# Patient Record
Sex: FEMALE | Race: WHITE | NOT HISPANIC OR LATINO | URBAN - METROPOLITAN AREA
[De-identification: names, ages, dates, MRNs, and addresses within clinical notes are randomized per-mention and may not be internally consistent; named-entity substitution may affect disease eponyms.]

---

## 2017-12-12 ENCOUNTER — EMERGENCY (EMERGENCY)
Facility: HOSPITAL | Age: 77
LOS: 1 days | Discharge: HOME | End: 2017-12-12
Attending: EMERGENCY MEDICINE | Admitting: EMERGENCY MEDICINE
Payer: MEDICARE

## 2017-12-12 VITALS
HEART RATE: 94 BPM | HEIGHT: 64 IN | DIASTOLIC BLOOD PRESSURE: 100 MMHG | SYSTOLIC BLOOD PRESSURE: 171 MMHG | RESPIRATION RATE: 16 BRPM | WEIGHT: 199.96 LBS | OXYGEN SATURATION: 98 % | TEMPERATURE: 98 F

## 2017-12-12 DIAGNOSIS — Z98.49 CATARACT EXTRACTION STATUS, UNSPECIFIED EYE: Chronic | ICD-10-CM

## 2017-12-12 PROCEDURE — 99282 EMERGENCY DEPT VISIT SF MDM: CPT

## 2017-12-12 PROCEDURE — 99284 EMERGENCY DEPT VISIT MOD MDM: CPT

## 2017-12-12 RX ORDER — ACETAMINOPHEN 500 MG
650 TABLET ORAL ONCE
Qty: 0 | Refills: 0 | Status: DISCONTINUED | OUTPATIENT
Start: 2017-12-12 | End: 2017-12-16

## 2017-12-12 NOTE — ED ADULT NURSE NOTE - CHPI ED SYMPTOMS NEG
no foreign body/no double vision/no purulent drainage/no eye lid swelling/no discharge/no drainage/no blurred vision/no itching/no photophobia/no pain

## 2017-12-12 NOTE — ED PROVIDER NOTE - OBJECTIVE STATEMENT
76 y/o F pt presents to ED c/o left eye redness associated with pain and  burning sensation. Pt notes every morning she rubs her eyes with cotton balls. Pt has scheduled eye appointment tomorrow morning. No blood thinners. No trauma, no falls. No further complaints at this time.

## 2022-09-12 ENCOUNTER — EMERGENCY (EMERGENCY)
Facility: HOSPITAL | Age: 82
LOS: 1 days | Discharge: HOME | End: 2022-09-12
Attending: EMERGENCY MEDICINE | Admitting: EMERGENCY MEDICINE

## 2022-09-12 VITALS
OXYGEN SATURATION: 98 % | WEIGHT: 190.04 LBS | DIASTOLIC BLOOD PRESSURE: 90 MMHG | HEART RATE: 90 BPM | TEMPERATURE: 98 F | RESPIRATION RATE: 14 BRPM | SYSTOLIC BLOOD PRESSURE: 166 MMHG | HEIGHT: 64 IN

## 2022-09-12 VITALS
SYSTOLIC BLOOD PRESSURE: 148 MMHG | TEMPERATURE: 98 F | RESPIRATION RATE: 16 BRPM | DIASTOLIC BLOOD PRESSURE: 78 MMHG | OXYGEN SATURATION: 99 % | HEART RATE: 87 BPM

## 2022-09-12 DIAGNOSIS — Z98.49 CATARACT EXTRACTION STATUS, UNSPECIFIED EYE: Chronic | ICD-10-CM

## 2022-09-12 PROCEDURE — 99283 EMERGENCY DEPT VISIT LOW MDM: CPT

## 2022-09-12 RX ORDER — LOPERAMIDE HCL 2 MG
4 TABLET ORAL ONCE
Refills: 0 | Status: COMPLETED | OUTPATIENT
Start: 2022-09-12 | End: 2022-09-12

## 2022-09-12 RX ADMIN — Medication 4 MILLIGRAM(S): at 19:57

## 2022-09-12 NOTE — ED PROVIDER NOTE - CARE PROVIDER_API CALL
Guillermo Belcher)  Surgery  [address] [address]  Phone: [phone]  Fax: [phone]  Follow Up Time: 1-3 Days    Bull Null)  ColonRectal Surgery; Surgery  [address], [address]  [place] [address]  Phone: [phone]  Fax: [phone]  Follow Up Time: 1-3 Days

## 2022-09-12 NOTE — ED PROVIDER NOTE - NSFOLLOWUPINSTRUCTIONS_ED_ALL_ED_FT
Rectal Pain    WHAT YOU NEED TO KNOW:    Rectal pain can be caused by a number of conditions, such as hemorrhoids, an abscess, trauma, or anal tear. Infection, muscle spasms, or anal intercourse can also cause rectal pain.     DISCHARGE INSTRUCTIONS:    Medicines: You may need any of the following:   •NSAIDs, such as ibuprofen, help decrease swelling, pain, and fever. This medicine is available with or without a doctor's order. NSAIDs can cause stomach bleeding or kidney problems in certain people. If you take blood thinner medicine, always ask your healthcare provider if NSAIDs are safe for you. Always read the medicine label and follow directions.      •Prescription pain medicine may be given. Ask how to take this medicine safely.      •Antibiotics help treat or prevent a bacterial infection.      •Bowel movement softeners help soften your bowel movement. They help prevent straining and more damage to the area.      •Take your medicine as directed. Contact your healthcare provider if you think your medicine is not helping or if you have side effects. Tell your provider if you are allergic to any medicine. Keep a list of the medicines, vitamins, and herbs you take. Include the amounts, and when and why you take them. Bring the list or the pill bottles to follow-up visits. Carry your medicine list with you in case of an emergency.      Return to the emergency department if:   •You have severe pain.          Contact your healthcare provider if:   •Your pain does not decrease after 1 to 2 days of treatment.      •You cannot take the medicine prescribed for your condition.      •You have questions or concerns about your condition or care.      Take a sitz bath: Fill a bathtub with 4 to 6 inches of warm water. You may also use a sitz bath pan that fits over a toilet. Sit in the sitz bath for 20 minutes. Do this 2 to 3 times a day, or as directed. The warm water can help decrease pain, muscle spasms, or swelling.     Apply heat: Apply a warm, moist compress on your anus for 20 to 30 minutes every 2 hours for as many days as directed. Heat helps decrease pain and muscle spasms.    Eat high-fiber foods: This will help prevent constipation and soften your bowel movements. High-fiber foods include fruit, vegetables, whole-grain breads and cereals, and beans. A dietitian or healthcare provider can help you create a high-fiber meal plan.     Drink liquids as directed: You may need to drink more liquid than usual to help soften your bowel movements. Ask how much liquid to drink each day and which liquids are best for you.     Follow up with your healthcare provider as directed: Write down your questions so you remember to ask them during your visits.       © Copyright [employer] 2022           Food Choices to Help Relieve Diarrhea, Adult      Diarrhea can make you feel weak and cause you to become dehydrated. It is important to choose the right foods and drinks to:  •Relieve diarrhea.      •Replace lost fluids and nutrients.      •Prevent dehydration.        What are tips for following this plan?    Relieving diarrhea   •Avoid foods that make your diarrhea worse. These may include:  •Foods and beverages sweetened with high-fructose corn syrup, honey, or sweeteners such as xylitol, sorbitol, and mannitol.      •Fried, greasy, or spicy foods.      •Raw fruits and vegetables.        •Eat foods that are rich in probiotics. These include foods such as yogurt and fermented milk products. Probiotics can help increase healthy bacteria in your stomach and intestines (gastrointestinal tract or GI tract). This may help digestion and stop diarrhea.      •If you have lactose intolerance, avoid dairy products. These may make your diarrhea worse.      •Take medicine to help stop diarrhea only as told by your health care provider.        Replacing nutrients      •Eat bland, easy-to-digest foods in small amounts as you are able, until your diarrhea starts to get better. These foods include bananas, applesauce, rice, toast, and crackers.    •Gradually reintroduce nutrient-rich foods as tolerated or as told by your health care provider. This includes:  •Well-cooked protein foods, such as eggs, lean meats like fish or chicken without skin, and tofu.      •Peeled, seeded, and soft-cooked fruits and vegetables.      •Low-fat dairy products.      •Whole grains.        •Take vitamin and mineral supplements as told by your health care provider.        Preventing dehydration      •Start by sipping water or a solution to prevent dehydration (oral rehydration solution, ORS). This is a drink that helps replace fluids and minerals your body has lost. You can buy an ORS at pharmacies and retail stores.      •Try to drink at least 8–10 cups (2,000–2,500 mL) of fluid each day to help replace lost fluids. If you have urine that is pale yellow, you are getting enough fluids.      •You may drink other liquids in addition to water, such as fruit juice that you have added water to (diluted fruit juice) or low-calorie sports drinks, as tolerated or as told by your health care provider.      •Avoid drinks with caffeine, such as coffee, tea, or soft drinks.      •Avoid alcohol.        Summary    •When you have diarrhea, it is important to choose the right foods and drinks to relieve diarrhea, to replace lost fluids and nutrients, and to prevent dehydration.      •Make sure you drink enough fluid to keep your urine pale yellow.      •You may benefit from eating bland foods at first. Gradually reintroduce healthy, nutrient-rich foods as tolerated or as told by your health care provider.      •Avoid foods that make your diarrhea worse, such as fried, greasy, or spicy foods.      This information is not intended to replace advice given to you by your health care provider. Make sure you discuss any questions you have with your health care provider.

## 2022-09-12 NOTE — ED PROVIDER NOTE - PROVIDER TOKENS
PROVIDER:[TOKEN:[[id number]],FOLLOWUP:[1-3 Days]],PROVIDER:[TOKEN:[[id number]],FOLLOWUP:[1-3 Days]]

## 2022-09-12 NOTE — ED PROVIDER NOTE - GASTROINTESTINAL, MLM
Rectal no external hemorrhoids noted at this time. Positive inflamed skin around anal area. No fluctuance. No apparent cellulitis. No unable to tolerate digital exam.

## 2022-09-12 NOTE — ED PROVIDER NOTE - OBJECTIVE STATEMENT
81 y/o female c/o rectal pain. States 6 days ago had abdominal pain and diarrhea and thought it was food related. Abdominal pain has subsided but loose stools continue. Stool is sometimes dark, sometimes yellow. No note of blood. No fever, nausea, vomiting. Remains without abdominal pain. Two days ago pt went to UC for rectal pain and was advised that she has hemorrhoids and was prescribed rectal steroid cream. Pt then saw GI doctor today and was prescribed an anesthetic cream and another steroid cream. Was advised to f/u with colorectal however pt states first appointment is not until almost 2 months. Pt still with pain and discomfort in rectum and burning especially with diarrhea. Pt has not taken anything for diarrhea.

## 2022-09-12 NOTE — ED PROVIDER NOTE - CLINICAL SUMMARY MEDICAL DECISION MAKING FREE TEXT BOX
83 y/o female c/o rectal irritation likely caused by 1 week of loose stools. Is already on rectal steroid and anesthetic. Has already seen OP GI. Will give antidiarrheal, discussed dietary changes to help with diarrhea. Will provide further referral for OP colorectal. Discussed pt should return for vomiting, abdominal pain, fever, or worsening symptoms.

## 2022-09-12 NOTE — ED PROVIDER NOTE - PATIENT PORTAL LINK FT
You can access the FollowMyHealth Patient Portal offered by [facility] by registering at the following website: [email or web address]. By joining [employer]’s FollowMyHealth portal, you will also be able to view your health information using other applications (apps) compatible with our system.

## 2022-09-12 NOTE — ED ADULT NURSE NOTE - ED COMFORT CARE
Lactation Rounds:    Mother called for lactation advise. Upon arrival mother reports severe primary engorgement. Large lumps underneath armpits visualized. Mother reports supernumerary nipple underneath right armpit leaking when pumping. Encouraged mother to pump 8-12 times per day. Limit pumping to 15-30minutes each session. Use hands on pumping technique. Mother reports using 24-27 mm flanges. Encouraged to trail 30mm flanges, referred to lawrence flange fit guide. Mother to bring pump parts and flanges tomorrow for verification of flange fit. Mother denies signs/symptoms of mastitis. Reviewed when to call OBGYN. Mother verbalized understanding. Will follow up tomorrow.     Primary Engorgement    If the milk is flowing, use wet or dry heat applied to the breasts for approximately 10min prior to each feeding as a comfort measure to facilitate the milk ejection reflex    Follow heat treatment with breast massage to soften hard/lumpy areas of the breast    Hand express or pump breasts to the point of comfort prn    Use cold treatments in the form of ice packs/gel packs/ frozen vegetables wrapped in a soft thin cloth and applied to the breasts for approximately 20min after each feeding until engorgement is resolved    Wear comfortable, supportive bra    Take pain medicine prn    Use anti-inflammatory medications if prescribed by physician                    Consent (Temporal Branch)/Introductory Paragraph: The rationale for Mohs was explained to the patient and consent was obtained. The risks, benefits and alternatives to therapy were discussed in detail. Specifically, the risks of damage to the temporal branch of the facial nerve, infection, scarring, bleeding, prolonged wound healing, incomplete removal, allergy to anesthesia, and recurrence were addressed. Prior to the procedure, the treatment site was clearly identified and confirmed by the patient. All components of Universal Protocol/PAUSE Rule completed. Patient informed/Explanation of wait

## 2022-09-12 NOTE — ED PROVIDER NOTE - PROGRESS NOTE DETAILS
Jacob Rodriguez :   Case discussed with Dr. Hobbs. No further treatment available from ER. Does advise pt f/u with GI or colorectal for scope. Provides name for referral.

## 2022-09-13 ENCOUNTER — INPATIENT (INPATIENT)
Facility: HOSPITAL | Age: 82
LOS: 3 days | Discharge: HOME | DRG: 386 | End: 2022-09-17
Attending: INTERNAL MEDICINE | Admitting: INTERNAL MEDICINE
Payer: MEDICARE

## 2022-09-13 VITALS
RESPIRATION RATE: 20 BRPM | TEMPERATURE: 99 F | DIASTOLIC BLOOD PRESSURE: 98 MMHG | WEIGHT: 190.04 LBS | OXYGEN SATURATION: 95 % | SYSTOLIC BLOOD PRESSURE: 168 MMHG | HEART RATE: 100 BPM | HEIGHT: 64 IN

## 2022-09-13 DIAGNOSIS — R19.7 DIARRHEA, UNSPECIFIED: ICD-10-CM

## 2022-09-13 DIAGNOSIS — K56.41 FECAL IMPACTION: ICD-10-CM

## 2022-09-13 DIAGNOSIS — K59.00 CONSTIPATION, UNSPECIFIED: ICD-10-CM

## 2022-09-13 DIAGNOSIS — Z98.49 CATARACT EXTRACTION STATUS, UNSPECIFIED EYE: Chronic | ICD-10-CM

## 2022-09-13 PROBLEM — I10 ESSENTIAL (PRIMARY) HYPERTENSION: Chronic | Status: ACTIVE | Noted: 2017-12-12

## 2022-09-13 LAB
ALBUMIN SERPL ELPH-MCNC: 3.7 G/DL — SIGNIFICANT CHANGE UP (ref 3.3–5)
ALP SERPL-CCNC: 96 U/L — SIGNIFICANT CHANGE UP (ref 30–120)
ALT FLD-CCNC: 23 U/L DA — SIGNIFICANT CHANGE UP (ref 10–60)
ANION GAP SERPL CALC-SCNC: 7 MMOL/L — SIGNIFICANT CHANGE UP (ref 5–17)
AST SERPL-CCNC: 18 U/L — SIGNIFICANT CHANGE UP (ref 10–40)
BASOPHILS # BLD AUTO: 0.02 K/UL — SIGNIFICANT CHANGE UP (ref 0–0.2)
BASOPHILS NFR BLD AUTO: 0.2 % — SIGNIFICANT CHANGE UP (ref 0–2)
BILIRUB SERPL-MCNC: 0.4 MG/DL — SIGNIFICANT CHANGE UP (ref 0.2–1.2)
BUN SERPL-MCNC: 12 MG/DL — SIGNIFICANT CHANGE UP (ref 7–23)
CALCIUM SERPL-MCNC: 8.8 MG/DL — SIGNIFICANT CHANGE UP (ref 8.4–10.5)
CHLORIDE SERPL-SCNC: 94 MMOL/L — LOW (ref 96–108)
CO2 SERPL-SCNC: 28 MMOL/L — SIGNIFICANT CHANGE UP (ref 22–31)
CREAT SERPL-MCNC: 0.66 MG/DL — SIGNIFICANT CHANGE UP (ref 0.5–1.3)
EGFR: 88 ML/MIN/1.73M2 — SIGNIFICANT CHANGE UP
EOSINOPHIL # BLD AUTO: 0.01 K/UL — SIGNIFICANT CHANGE UP (ref 0–0.5)
EOSINOPHIL NFR BLD AUTO: 0.1 % — SIGNIFICANT CHANGE UP (ref 0–6)
GLUCOSE SERPL-MCNC: 139 MG/DL — HIGH (ref 70–99)
HCT VFR BLD CALC: 40.4 % — SIGNIFICANT CHANGE UP (ref 34.5–45)
HGB BLD-MCNC: 13.6 G/DL — SIGNIFICANT CHANGE UP (ref 11.5–15.5)
IMM GRANULOCYTES NFR BLD AUTO: 0.3 % — SIGNIFICANT CHANGE UP (ref 0–1.5)
LIDOCAIN IGE QN: 37 U/L — LOW (ref 73–393)
LYMPHOCYTES # BLD AUTO: 1.41 K/UL — SIGNIFICANT CHANGE UP (ref 1–3.3)
LYMPHOCYTES # BLD AUTO: 15.4 % — SIGNIFICANT CHANGE UP (ref 13–44)
MCHC RBC-ENTMCNC: 27.8 PG — SIGNIFICANT CHANGE UP (ref 27–34)
MCHC RBC-ENTMCNC: 33.7 GM/DL — SIGNIFICANT CHANGE UP (ref 32–36)
MCV RBC AUTO: 82.6 FL — SIGNIFICANT CHANGE UP (ref 80–100)
MONOCYTES # BLD AUTO: 0.68 K/UL — SIGNIFICANT CHANGE UP (ref 0–0.9)
MONOCYTES NFR BLD AUTO: 7.4 % — SIGNIFICANT CHANGE UP (ref 2–14)
NEUTROPHILS # BLD AUTO: 6.99 K/UL — SIGNIFICANT CHANGE UP (ref 1.8–7.4)
NEUTROPHILS NFR BLD AUTO: 76.6 % — SIGNIFICANT CHANGE UP (ref 43–77)
NRBC # BLD: 0 /100 WBCS — SIGNIFICANT CHANGE UP (ref 0–0)
PLATELET # BLD AUTO: 289 K/UL — SIGNIFICANT CHANGE UP (ref 150–400)
POTASSIUM SERPL-MCNC: 4 MMOL/L — SIGNIFICANT CHANGE UP (ref 3.5–5.3)
POTASSIUM SERPL-SCNC: 4 MMOL/L — SIGNIFICANT CHANGE UP (ref 3.5–5.3)
PROT SERPL-MCNC: 7.4 G/DL — SIGNIFICANT CHANGE UP (ref 6–8.3)
RBC # BLD: 4.89 M/UL — SIGNIFICANT CHANGE UP (ref 3.8–5.2)
RBC # FLD: 14.2 % — SIGNIFICANT CHANGE UP (ref 10.3–14.5)
SARS-COV-2 RNA SPEC QL NAA+PROBE: SIGNIFICANT CHANGE UP
SODIUM SERPL-SCNC: 129 MMOL/L — LOW (ref 135–145)
WBC # BLD: 9.14 K/UL — SIGNIFICANT CHANGE UP (ref 3.8–10.5)
WBC # FLD AUTO: 9.14 K/UL — SIGNIFICANT CHANGE UP (ref 3.8–10.5)

## 2022-09-13 PROCEDURE — 99223 1ST HOSP IP/OBS HIGH 75: CPT

## 2022-09-13 PROCEDURE — 74177 CT ABD & PELVIS W/CONTRAST: CPT | Mod: 26,MA

## 2022-09-13 PROCEDURE — 93010 ELECTROCARDIOGRAM REPORT: CPT

## 2022-09-13 PROCEDURE — 99285 EMERGENCY DEPT VISIT HI MDM: CPT | Mod: FS

## 2022-09-13 PROCEDURE — 71045 X-RAY EXAM CHEST 1 VIEW: CPT | Mod: 26

## 2022-09-13 RX ORDER — SOD SULF/SODIUM/NAHCO3/KCL/PEG
1000 SOLUTION, RECONSTITUTED, ORAL ORAL ONCE
Refills: 0 | Status: COMPLETED | OUTPATIENT
Start: 2022-09-13 | End: 2022-09-13

## 2022-09-13 RX ORDER — ENOXAPARIN SODIUM 100 MG/ML
40 INJECTION SUBCUTANEOUS EVERY 24 HOURS
Refills: 0 | Status: DISCONTINUED | OUTPATIENT
Start: 2022-09-14 | End: 2022-09-17

## 2022-09-13 RX ORDER — KETOROLAC TROMETHAMINE 30 MG/ML
15 SYRINGE (ML) INJECTION ONCE
Refills: 0 | Status: DISCONTINUED | OUTPATIENT
Start: 2022-09-13 | End: 2022-09-13

## 2022-09-13 RX ORDER — SENNA PLUS 8.6 MG/1
2 TABLET ORAL AT BEDTIME
Refills: 0 | Status: DISCONTINUED | OUTPATIENT
Start: 2022-09-13 | End: 2022-09-17

## 2022-09-13 RX ORDER — ONDANSETRON 8 MG/1
4 TABLET, FILM COATED ORAL ONCE
Refills: 0 | Status: COMPLETED | OUTPATIENT
Start: 2022-09-13 | End: 2022-09-13

## 2022-09-13 RX ORDER — POLYETHYLENE GLYCOL 3350 17 G/17G
17 POWDER, FOR SOLUTION ORAL DAILY
Refills: 0 | Status: DISCONTINUED | OUTPATIENT
Start: 2022-09-13 | End: 2022-09-17

## 2022-09-13 RX ORDER — DIAZEPAM 5 MG
2 TABLET ORAL ONCE
Refills: 0 | Status: DISCONTINUED | OUTPATIENT
Start: 2022-09-13 | End: 2022-09-13

## 2022-09-13 RX ORDER — LOSARTAN POTASSIUM 100 MG/1
50 TABLET, FILM COATED ORAL DAILY
Refills: 0 | Status: DISCONTINUED | OUTPATIENT
Start: 2022-09-13 | End: 2022-09-16

## 2022-09-13 RX ORDER — SODIUM CHLORIDE 9 MG/ML
1000 INJECTION INTRAMUSCULAR; INTRAVENOUS; SUBCUTANEOUS ONCE
Refills: 0 | Status: COMPLETED | OUTPATIENT
Start: 2022-09-13 | End: 2022-09-13

## 2022-09-13 RX ORDER — ACETAMINOPHEN 500 MG
650 TABLET ORAL EVERY 6 HOURS
Refills: 0 | Status: DISCONTINUED | OUTPATIENT
Start: 2022-09-13 | End: 2022-09-17

## 2022-09-13 RX ADMIN — SODIUM CHLORIDE 1000 MILLILITER(S): 9 INJECTION INTRAMUSCULAR; INTRAVENOUS; SUBCUTANEOUS at 12:14

## 2022-09-13 RX ADMIN — SODIUM CHLORIDE 1000 MILLILITER(S): 9 INJECTION INTRAMUSCULAR; INTRAVENOUS; SUBCUTANEOUS at 10:58

## 2022-09-13 RX ADMIN — Medication 15 MILLIGRAM(S): at 16:00

## 2022-09-13 RX ADMIN — Medication 15 MILLIGRAM(S): at 10:58

## 2022-09-13 RX ADMIN — Medication 650 MILLIGRAM(S): at 23:45

## 2022-09-13 RX ADMIN — Medication 15 MILLIGRAM(S): at 15:40

## 2022-09-13 RX ADMIN — ONDANSETRON 4 MILLIGRAM(S): 8 TABLET, FILM COATED ORAL at 10:58

## 2022-09-13 RX ADMIN — Medication 15 MILLIGRAM(S): at 11:15

## 2022-09-13 RX ADMIN — Medication 1000 MILLILITER(S): at 21:57

## 2022-09-13 RX ADMIN — SENNA PLUS 2 TABLET(S): 8.6 TABLET ORAL at 21:57

## 2022-09-13 RX ADMIN — Medication 2 MILLIGRAM(S): at 12:48

## 2022-09-13 RX ADMIN — Medication 650 MILLIGRAM(S): at 23:10

## 2022-09-13 NOTE — ED PROVIDER NOTE - NS ED ATTENDING STATEMENT MOD
This was a shared visit with the BONILLA. I reviewed and verified the documentation and independently performed the documented:

## 2022-09-13 NOTE — ED PROVIDER NOTE - OBJECTIVE STATEMENT
82-year-old female with history of hypertension presents with diarrhea and rectal pain.  Patient reports started having diarrhea 6 days ago.  Ate Burger Moody the day before and unsure if related.  Started to have mild rectal pain that day, rectal pain has progressively getting worse.  States diarrhea very loose in nature.  No noticed blood in her diarrhea.  No fevers.  Was seen at urgent care 2 days ago and told rectal pain likely due to hemorrhoid.  Was given rectal steroid cream.  Was seen by GI yesterday and was given an anesthetic cream and another steroid cream.  Was recommended to follow-up with colorectal surgery.  Unable to make appointment for 2 months.  Was seen at this emergency room last night for her rectal pain.  Patient was discussed with Dr. Hobbs by ED attending. told at that time likely nothing else to do.  Recommended to follow-up with colorectal surgery.  Patient reports having a hard time sleeping last night due to the rectal pain and discomfort.  Slight abdominal discomfort the past 2 days.  States mild crampy in nature.  Slight nausea since yesterday, no vomiting.  No recent antibiotic use.  No foreign travels.  No known sick exposures.  Previous abdominal surgery include cholecystectomy.  PCP Carmen Mckeon

## 2022-09-13 NOTE — ED ADULT NURSE NOTE - OBJECTIVE STATEMENT
82 YOF A&OX3 presents to ED for rectal pain. pt states was in ED last night and has rectal pain that did not go away. pt rates pain 10/10. pt denies bloody stools, n/v/d, headaches, sob, chest pain. safety maintained. 82 YOF A&OX3 presents to ED for rectal pain and facial neuralgia. pt states was in ED last night and has rectal pain that did not go away. pt rates pain 10/10. pt denies bloody stools, n/v/d, headaches, sob, chest pain. safety maintained.

## 2022-09-13 NOTE — ED PROVIDER NOTE - GASTROINTESTINAL NEGATIVE STATEMENT, MLM
mild abdominal pain, no bloating, no constipation, + diarrhea, slight nausea and no vomiting. +rectal pain

## 2022-09-13 NOTE — ED PROVIDER NOTE - GASTROINTESTINAL, MLM
Abdomen soft, non-tender, no guarding. no rebound or distention. no obvious ext hemorrhoids or jerrod-rectal abscess. refusing rectal exam

## 2022-09-13 NOTE — H&P ADULT - ASSESSMENT
82-year-old female with history of hypertension presents with diarrhea and rectal pain.  Patient reports started having diarrhea 6 days ago.  Ate Burger Moody the day before and unsure if related.  Started to have mild rectal pain that day, rectal pain has progressively getting worse.  States diarrhea very loose in nature.  No noticed blood in her diarrhea.  No fevers.  Was seen at urgent care 2 days ago and told rectal pain likely due to hemorrhoid.  Was given rectal steroid cream.  Was seen by GI yesterday and was given an anesthetic cream and another steroid cream.  Was recommended to follow-up with colorectal surgery.  Unable to make appointment for 2 months.  Was seen at this emergency room last night for her rectal pain.  Patient was discussed with Dr. Hobbs by ED attending. told at that time likely nothing else to do.  Recommended to follow-up with colorectal surgery.  Patient reports having a hard time sleeping last night due to the rectal pain and discomfort.  Slight abdominal discomfort the past 2 days.  States mild crampy in nature.  Slight nausea since yesterday, no vomiting.  No recent antibiotic use.  No foreign travels.  No known sick exposures.  Previous abdominal surgery include cholecystectomy.PCP Carmen Mckeon  had CT abd in ED < from: CT Abdomen and Pelvis w/ IV Cont (09.13.22 @ 12:08) >  Constipated colon with stercoral proctocolitis  1.5 x 1.2 cm pancreatic head cystic lesion. Recommend MRI   Small sliding hiatus hernia with thickened distal esophagus. Recommend   endoscopy.  received enema and disimpaction tried by ED physician and GI consult called   82-year-old female with history of hypertension presents with diarrhea and rectal pain.  Patient reports started having diarrhea 6 days ago.  Ate Burger Moody the day before and unsure if related.  Started to have mild rectal pain that day, rectal pain has progressively getting worse.  States diarrhea very loose in nature.  No noticed blood in her diarrhea.  No fevers.  Was seen at urgent care 2 days ago and told rectal pain likely due to hemorrhoid.  Was given rectal steroid cream.  Was seen by GI yesterday and was given an anesthetic cream and another steroid cream.  Was recommended to follow-up with colorectal surgery.  Unable to make appointment for 2 months.  Was seen at this emergency room last night for her rectal pain.  Patient was discussed with Dr. Hobbs by ED attending. told at that time likely nothing else to do.  Recommended to follow-up with colorectal surgery.  Patient reports having a hard time sleeping last night due to the rectal pain and discomfort.  Slight abdominal discomfort the past 2 days.  States mild crampy in nature.  Slight nausea since yesterday, no vomiting.  No recent antibiotic use.  No foreign travels.  No known sick exposures.  Previous abdominal surgery include cholecystectomy.PCP Carmen Mckeon  had CT abd in ED < from: CT Abdomen and Pelvis w/ IV Cont (09.13.22 @ 12:08) >  Constipated colon with stercoral proctocolitis  1.5 x 1.2 cm pancreatic head cystic lesion. Recommend MRI   Small sliding hiatus hernia with thickened distal esophagus. Recommend   endoscopy.  received enema and disimpaction tried by ED physician and GI consult called   fecal impaction with proctocolitis with overflow incontinence  HTN

## 2022-09-13 NOTE — ED ADULT NURSE REASSESSMENT NOTE - NS ED NURSE REASSESS COMMENT FT1
pt states took PM meds self administered at 6pm. MD Pettit aware and at bedside. safety maintained.
pt unable to provide stool sample due to constipation. pt admitted, awaiting bed.
pt received at change of shift, pt resting on stretcher, bed in low position, call bell within reach, plan of care discussed, will monitor.  pt admitted, awaiting bed assignment.

## 2022-09-13 NOTE — PATIENT PROFILE ADULT - FALL HARM RISK - UNIVERSAL INTERVENTIONS
Bed in lowest position, wheels locked, appropriate side rails in place/Call bell, personal items and telephone in reach/Instruct patient to call for assistance before getting out of bed or chair/Non-slip footwear when patient is out of bed/[place] to call system/Physically safe environment - no spills, clutter or unnecessary equipment/Purposeful Proactive Rounding/Room/bathroom lighting operational, light cord in reach

## 2022-09-13 NOTE — H&P ADULT - TIME BILLING
I have discussed care plan with patient ,expressed understanding of problems treatment and their effect and side effects, alternatives in detail,I have asked if they have any questions and concerns and appropriately addressed them to best of my ability  Reviewed all diagonostic tests, lab results and drug drug interactions, and medications

## 2022-09-13 NOTE — ED PROVIDER NOTE - CLINICAL SUMMARY MEDICAL DECISION MAKING FREE TEXT BOX
DT: I have personally performed a face to face diagnostic evaluation on this patient.  I have reviewed the PA's note and agree with the history, exam, and plan of care, except as noted.  History and Exam by me shows 82-year-old female with history of hypertension presents with diarrhea and rectal pain.  Patient reports started having diarrhea 6 days ago.  Ate Burger Moody the day before and unsure if related.  Started to have mild rectal pain that day, rectal pain has progressively getting worse.  States diarrhea very loose in nature.  No noticed blood in her diarrhea.  No fevers.  Was seen at urgent care 2 days ago and told rectal pain likely due to hemorrhoid.  Was given rectal steroid cream.  Was seen by GI yesterday and was given an anesthetic cream and another steroid cream.  Was recommended to follow-up with colorectal surgery.  Unable to make appointment for 2 months.  Was seen at this emergency room last night for her rectal pain.  Patient was discussed with Dr. Hobbs by ED attending. told at that time likely nothing else to do.  Recommended to follow-up with colorectal surgery.  Patient reports having a hard time sleeping last night due to the rectal pain and discomfort.  Slight abdominal discomfort the past 2 days.  States mild crampy in nature.  Slight nausea since yesterday, no vomiting.  No recent antibiotic use.  No foreign travels.  No known sick exposures.  Previous abdominal surgery include cholecystectomy.  Patient is NAD.  A n O x 3. Head NC/AT. Lungs cta bl. Heart s1,s2, rrr, no murmurs. Abd-soft, nt, no guarding, no rebound, no distension, no cva tenderness. Ext- FROM actively,  ambulating s any difficulty.  Labs and ct  were sig for fecal impaction.

## 2022-09-13 NOTE — H&P ADULT - NEUROLOGICAL
cranial nerves II-XII intact/sensation intact/responds to pain/responds to verbal commands/deep reflexes intact negative

## 2022-09-13 NOTE — ED PROVIDER NOTE - PROGRESS NOTE DETAILS
CT results were discussed with patient. Moderate stool burden with rectum distended to 7.4 cm. suspect diarrhea due to overflow diarrhea. disimpaction was performed and small-moderate amount of soft stool removed. SMOG enema given and small amount of stool removed. continues to be uncomfortable and reports rectal discomfort. will plan to admit. GI paged spoke with Dr. Baires, kindly accepts patient for admission. GI paged, pending response. spoke with Dr. Lizarraga. will see in hospital, likely tomorrow. recommends 1 liter of moviprep. also recommends surgery consult since rectum is distended spoke with Dr. Pettit, will see patient in hospital for consult as requested by Dr. Lizarraga

## 2022-09-13 NOTE — H&P ADULT - HISTORY OF PRESENT ILLNESS
82-year-old female with history of hypertension presents with diarrhea and rectal pain.  Patient reports started having diarrhea 6 days ago.  Ate Burger Moody the day before and unsure if related.  Started to have mild rectal pain that day, rectal pain has progressively getting worse.  States diarrhea very loose in nature.  No noticed blood in her diarrhea.  No fevers.  Was seen at urgent care 2 days ago and told rectal pain likely due to hemorrhoid.  Was given rectal steroid cream.  Was seen by GI yesterday and was given an anesthetic cream and another steroid cream.  Was recommended to follow-up with colorectal surgery.  Unable to make appointment for 2 months.  Was seen at this emergency room last night for her rectal pain.  Patient was discussed with Dr. Hobbs by ED attending. told at that time likely nothing else to do.  Recommended to follow-up with colorectal surgery.  Patient reports having a hard time sleeping last night due to the rectal pain and discomfort.  Slight abdominal discomfort the past 2 days.  States mild crampy in nature.  Slight nausea since yesterday, no vomiting.  No recent antibiotic use.  No foreign travels.  No known sick exposures.  Previous abdominal surgery include cholecystectomy.PCP Carmen Mckeon  had CT abd in ED < from: CT Abdomen and Pelvis w/ IV Cont (09.13.22 @ 12:08) >  Constipated colon with stercoral proctocolitis  1.5 x 1.2 cm pancreatic head cystic lesion. Recommend MRI   Small sliding hiatus hernia with thickened distal esophagus. Recommend   endoscopy.  received enema and disimpaction tried by ED physician and GI consult called

## 2022-09-13 NOTE — H&P ADULT - SKIN/BREAST
[facility] [place] 
THE [facility] EMERGENCY DEPT 
[facility] [place] [id number] [phone] Work/School Note Date: 9/13/2017 To Whom It May concern: 
 
Facundo Gage was seen and treated today in the emergency room by the following provider(s): 
Attending Provider: Evelina Bailey MD. Facundo Gage may return to work on 09/18/2017. Sincerely, 
 
 
 
 
 
[employer].  Karma Bermudez MD 
 
 

negative

## 2022-09-14 DIAGNOSIS — I10 ESSENTIAL (PRIMARY) HYPERTENSION: ICD-10-CM

## 2022-09-14 LAB
ALBUMIN SERPL ELPH-MCNC: 3.7 G/DL — SIGNIFICANT CHANGE UP (ref 3.3–5)
ALP SERPL-CCNC: 100 U/L — SIGNIFICANT CHANGE UP (ref 30–120)
ALT FLD-CCNC: 27 U/L DA — SIGNIFICANT CHANGE UP (ref 10–60)
ANION GAP SERPL CALC-SCNC: 9 MMOL/L — SIGNIFICANT CHANGE UP (ref 5–17)
AST SERPL-CCNC: 21 U/L — SIGNIFICANT CHANGE UP (ref 10–40)
BASOPHILS # BLD AUTO: 0.06 K/UL — SIGNIFICANT CHANGE UP (ref 0–0.2)
BASOPHILS NFR BLD AUTO: 0.7 % — SIGNIFICANT CHANGE UP (ref 0–2)
BILIRUB SERPL-MCNC: 0.4 MG/DL — SIGNIFICANT CHANGE UP (ref 0.2–1.2)
BUN SERPL-MCNC: 12 MG/DL — SIGNIFICANT CHANGE UP (ref 7–23)
CALCIUM SERPL-MCNC: 8.6 MG/DL — SIGNIFICANT CHANGE UP (ref 8.4–10.5)
CHLORIDE SERPL-SCNC: 99 MMOL/L — SIGNIFICANT CHANGE UP (ref 96–108)
CHOLEST SERPL-MCNC: 245 MG/DL — HIGH
CO2 SERPL-SCNC: 26 MMOL/L — SIGNIFICANT CHANGE UP (ref 22–31)
CREAT SERPL-MCNC: 0.58 MG/DL — SIGNIFICANT CHANGE UP (ref 0.5–1.3)
EGFR: 91 ML/MIN/1.73M2 — SIGNIFICANT CHANGE UP
EOSINOPHIL # BLD AUTO: 0.09 K/UL — SIGNIFICANT CHANGE UP (ref 0–0.5)
EOSINOPHIL NFR BLD AUTO: 1 % — SIGNIFICANT CHANGE UP (ref 0–6)
GLUCOSE SERPL-MCNC: 119 MG/DL — HIGH (ref 70–99)
HCT VFR BLD CALC: 43.5 % — SIGNIFICANT CHANGE UP (ref 34.5–45)
HDLC SERPL-MCNC: 68 MG/DL — SIGNIFICANT CHANGE UP
HGB BLD-MCNC: 14.1 G/DL — SIGNIFICANT CHANGE UP (ref 11.5–15.5)
IMM GRANULOCYTES NFR BLD AUTO: 0.5 % — SIGNIFICANT CHANGE UP (ref 0–1.5)
LIPID PNL WITH DIRECT LDL SERPL: 166 MG/DL — HIGH
LYMPHOCYTES # BLD AUTO: 2.37 K/UL — SIGNIFICANT CHANGE UP (ref 1–3.3)
LYMPHOCYTES # BLD AUTO: 25.8 % — SIGNIFICANT CHANGE UP (ref 13–44)
MCHC RBC-ENTMCNC: 27.3 PG — SIGNIFICANT CHANGE UP (ref 27–34)
MCHC RBC-ENTMCNC: 32.4 GM/DL — SIGNIFICANT CHANGE UP (ref 32–36)
MCV RBC AUTO: 84.3 FL — SIGNIFICANT CHANGE UP (ref 80–100)
MONOCYTES # BLD AUTO: 0.77 K/UL — SIGNIFICANT CHANGE UP (ref 0–0.9)
MONOCYTES NFR BLD AUTO: 8.4 % — SIGNIFICANT CHANGE UP (ref 2–14)
NEUTROPHILS # BLD AUTO: 5.84 K/UL — SIGNIFICANT CHANGE UP (ref 1.8–7.4)
NEUTROPHILS NFR BLD AUTO: 63.6 % — SIGNIFICANT CHANGE UP (ref 43–77)
NON HDL CHOLESTEROL: 177 MG/DL — HIGH
NRBC # BLD: 0 /100 WBCS — SIGNIFICANT CHANGE UP (ref 0–0)
PLATELET # BLD AUTO: 303 K/UL — SIGNIFICANT CHANGE UP (ref 150–400)
POTASSIUM SERPL-MCNC: 3.9 MMOL/L — SIGNIFICANT CHANGE UP (ref 3.5–5.3)
POTASSIUM SERPL-SCNC: 3.9 MMOL/L — SIGNIFICANT CHANGE UP (ref 3.5–5.3)
PROT SERPL-MCNC: 7.4 G/DL — SIGNIFICANT CHANGE UP (ref 6–8.3)
RBC # BLD: 5.16 M/UL — SIGNIFICANT CHANGE UP (ref 3.8–5.2)
RBC # FLD: 14.6 % — HIGH (ref 10.3–14.5)
SODIUM SERPL-SCNC: 134 MMOL/L — LOW (ref 135–145)
T3 SERPL-MCNC: 105 NG/DL — SIGNIFICANT CHANGE UP (ref 80–200)
T4 AB SER-ACNC: 5.7 UG/DL — SIGNIFICANT CHANGE UP (ref 4.6–12)
TRIGL SERPL-MCNC: 55 MG/DL — SIGNIFICANT CHANGE UP
TSH SERPL-MCNC: 2.22 UIU/ML — SIGNIFICANT CHANGE UP (ref 0.27–4.2)
WBC # BLD: 9.18 K/UL — SIGNIFICANT CHANGE UP (ref 3.8–10.5)
WBC # FLD AUTO: 9.18 K/UL — SIGNIFICANT CHANGE UP (ref 3.8–10.5)

## 2022-09-14 PROCEDURE — 99232 SBSQ HOSP IP/OBS MODERATE 35: CPT | Mod: FS

## 2022-09-14 RX ORDER — PHENYLEPHRINE-SHARK LIVER OIL-MINERAL OIL-PETROLATUM RECTAL OINTMENT
1 OINTMENT (GRAM) RECTAL
Refills: 0 | Status: DISCONTINUED | OUTPATIENT
Start: 2022-09-14 | End: 2022-09-17

## 2022-09-14 RX ORDER — KETOROLAC TROMETHAMINE 30 MG/ML
15 SYRINGE (ML) INJECTION EVERY 8 HOURS
Refills: 0 | Status: DISCONTINUED | OUTPATIENT
Start: 2022-09-14 | End: 2022-09-17

## 2022-09-14 RX ORDER — AMLODIPINE BESYLATE 2.5 MG/1
5 TABLET ORAL ONCE
Refills: 0 | Status: COMPLETED | OUTPATIENT
Start: 2022-09-14 | End: 2022-09-14

## 2022-09-14 RX ADMIN — Medication 15 MILLIGRAM(S): at 16:58

## 2022-09-14 RX ADMIN — PHENYLEPHRINE-SHARK LIVER OIL-MINERAL OIL-PETROLATUM RECTAL OINTMENT 1 APPLICATION(S): at 18:45

## 2022-09-14 RX ADMIN — Medication 650 MILLIGRAM(S): at 23:39

## 2022-09-14 RX ADMIN — AMLODIPINE BESYLATE 5 MILLIGRAM(S): 2.5 TABLET ORAL at 00:19

## 2022-09-14 RX ADMIN — Medication 650 MILLIGRAM(S): at 10:11

## 2022-09-14 RX ADMIN — Medication 650 MILLIGRAM(S): at 09:11

## 2022-09-14 RX ADMIN — LOSARTAN POTASSIUM 50 MILLIGRAM(S): 100 TABLET, FILM COATED ORAL at 05:52

## 2022-09-14 RX ADMIN — Medication 15 MILLIGRAM(S): at 17:58

## 2022-09-14 NOTE — PHYSICAL THERAPY INITIAL EVALUATION ADULT - PERTINENT HX OF CURRENT PROBLEM, REHAB EVAL
82-year-old female with history of hypertension presents with diarrhea and rectal pain.  Patient reports started having diarrhea 6 days ago.  Ate Burger Moody the day before and unsure if related.  Started to have mild rectal pain that day, rectal pain has progressively getting worse.  States diarrhea very loose in nature.  No noticed blood in her diarrhea.  No fevers.  Was seen at urgent care 2 days ago and told rectal pain likely due to hemorrhoid.  Was given rectal steroid cream.  Was seen by GI yesterday and was given an anesthetic cream and another steroid cream.  Was recommended to follow-up with colorectal surgery.  Unable to make appointment for 2 months.  Was seen at this emergency room last night for her rectal pain.  Patient was discussed with Dr. Hobbs by ED attending. told at that time likely nothing else to do.  Recommended to follow-up with colorectal surgery.  Patient reports having a hard time sleeping last night due to the rectal pain and discomfort.  Slight abdominal discomfort the past 2 days.  States mild crampy in nature.  Slight nausea since yesterday, no vomiting.  No recent antibiotic use.  No foreign travels.  No known sick exposures.  Previous abdominal surgery include cholecystectomy.PCP Carmen Gomez

## 2022-09-14 NOTE — PHYSICAL THERAPY INITIAL EVALUATION ADULT - ADDITIONAL COMMENTS
Pt lives alone in private home. Home has 5 [address]ERY[address] with handrail and none inside. Pt was independent PTA. She drives and works as [employer] . Pt does not use any AD.

## 2022-09-15 LAB
ALBUMIN SERPL ELPH-MCNC: 3.9 G/DL — SIGNIFICANT CHANGE UP (ref 3.3–5)
ALP SERPL-CCNC: 107 U/L — SIGNIFICANT CHANGE UP (ref 30–120)
ALT FLD-CCNC: 28 U/L DA — SIGNIFICANT CHANGE UP (ref 10–60)
ANION GAP SERPL CALC-SCNC: 7 MMOL/L — SIGNIFICANT CHANGE UP (ref 5–17)
AST SERPL-CCNC: 20 U/L — SIGNIFICANT CHANGE UP (ref 10–40)
BASOPHILS # BLD AUTO: 0.04 K/UL — SIGNIFICANT CHANGE UP (ref 0–0.2)
BASOPHILS NFR BLD AUTO: 0.4 % — SIGNIFICANT CHANGE UP (ref 0–2)
BILIRUB SERPL-MCNC: 0.4 MG/DL — SIGNIFICANT CHANGE UP (ref 0.2–1.2)
BUN SERPL-MCNC: 9 MG/DL — SIGNIFICANT CHANGE UP (ref 7–23)
CALCIUM SERPL-MCNC: 8.9 MG/DL — SIGNIFICANT CHANGE UP (ref 8.4–10.5)
CHLORIDE SERPL-SCNC: 99 MMOL/L — SIGNIFICANT CHANGE UP (ref 96–108)
CO2 SERPL-SCNC: 32 MMOL/L — HIGH (ref 22–31)
CREAT SERPL-MCNC: 0.63 MG/DL — SIGNIFICANT CHANGE UP (ref 0.5–1.3)
EGFR: 89 ML/MIN/1.73M2 — SIGNIFICANT CHANGE UP
EOSINOPHIL # BLD AUTO: 0.14 K/UL — SIGNIFICANT CHANGE UP (ref 0–0.5)
EOSINOPHIL NFR BLD AUTO: 1.5 % — SIGNIFICANT CHANGE UP (ref 0–6)
GI PCR PANEL: SIGNIFICANT CHANGE UP
GLUCOSE SERPL-MCNC: 129 MG/DL — HIGH (ref 70–99)
HCT VFR BLD CALC: 43.8 % — SIGNIFICANT CHANGE UP (ref 34.5–45)
HGB BLD-MCNC: 14.5 G/DL — SIGNIFICANT CHANGE UP (ref 11.5–15.5)
IMM GRANULOCYTES NFR BLD AUTO: 0.4 % — SIGNIFICANT CHANGE UP (ref 0–1.5)
LYMPHOCYTES # BLD AUTO: 2.06 K/UL — SIGNIFICANT CHANGE UP (ref 1–3.3)
LYMPHOCYTES # BLD AUTO: 22.2 % — SIGNIFICANT CHANGE UP (ref 13–44)
MCHC RBC-ENTMCNC: 27.4 PG — SIGNIFICANT CHANGE UP (ref 27–34)
MCHC RBC-ENTMCNC: 33.1 GM/DL — SIGNIFICANT CHANGE UP (ref 32–36)
MCV RBC AUTO: 82.8 FL — SIGNIFICANT CHANGE UP (ref 80–100)
MONOCYTES # BLD AUTO: 0.75 K/UL — SIGNIFICANT CHANGE UP (ref 0–0.9)
MONOCYTES NFR BLD AUTO: 8.1 % — SIGNIFICANT CHANGE UP (ref 2–14)
NEUTROPHILS # BLD AUTO: 6.27 K/UL — SIGNIFICANT CHANGE UP (ref 1.8–7.4)
NEUTROPHILS NFR BLD AUTO: 67.4 % — SIGNIFICANT CHANGE UP (ref 43–77)
NRBC # BLD: 0 /100 WBCS — SIGNIFICANT CHANGE UP (ref 0–0)
PLATELET # BLD AUTO: 348 K/UL — SIGNIFICANT CHANGE UP (ref 150–400)
POTASSIUM SERPL-MCNC: 3.6 MMOL/L — SIGNIFICANT CHANGE UP (ref 3.5–5.3)
POTASSIUM SERPL-SCNC: 3.6 MMOL/L — SIGNIFICANT CHANGE UP (ref 3.5–5.3)
PROT SERPL-MCNC: 7.2 G/DL — SIGNIFICANT CHANGE UP (ref 6–8.3)
RBC # BLD: 5.29 M/UL — HIGH (ref 3.8–5.2)
RBC # FLD: 14.6 % — HIGH (ref 10.3–14.5)
SODIUM SERPL-SCNC: 138 MMOL/L — SIGNIFICANT CHANGE UP (ref 135–145)
WBC # BLD: 9.3 K/UL — SIGNIFICANT CHANGE UP (ref 3.8–10.5)
WBC # FLD AUTO: 9.3 K/UL — SIGNIFICANT CHANGE UP (ref 3.8–10.5)

## 2022-09-15 PROCEDURE — 99232 SBSQ HOSP IP/OBS MODERATE 35: CPT | Mod: FS

## 2022-09-15 RX ORDER — OXCARBAZEPINE 300 MG/1
300 TABLET, FILM COATED ORAL
Refills: 0 | Status: DISCONTINUED | OUTPATIENT
Start: 2022-09-15 | End: 2022-09-17

## 2022-09-15 RX ORDER — ALBUTEROL 90 UG/1
1.25 AEROSOL, METERED ORAL EVERY 8 HOURS
Refills: 0 | Status: DISCONTINUED | OUTPATIENT
Start: 2022-09-15 | End: 2022-09-17

## 2022-09-15 RX ORDER — AMLODIPINE BESYLATE 2.5 MG/1
5 TABLET ORAL DAILY
Refills: 0 | Status: DISCONTINUED | OUTPATIENT
Start: 2022-09-15 | End: 2022-09-16

## 2022-09-15 RX ADMIN — PHENYLEPHRINE-SHARK LIVER OIL-MINERAL OIL-PETROLATUM RECTAL OINTMENT 1 APPLICATION(S): at 20:51

## 2022-09-15 RX ADMIN — Medication 650 MILLIGRAM(S): at 07:15

## 2022-09-15 RX ADMIN — Medication 650 MILLIGRAM(S): at 13:29

## 2022-09-15 RX ADMIN — OXCARBAZEPINE 300 MILLIGRAM(S): 300 TABLET, FILM COATED ORAL at 23:01

## 2022-09-15 RX ADMIN — AMLODIPINE BESYLATE 5 MILLIGRAM(S): 2.5 TABLET ORAL at 09:43

## 2022-09-15 RX ADMIN — SENNA PLUS 2 TABLET(S): 8.6 TABLET ORAL at 23:01

## 2022-09-15 RX ADMIN — Medication 650 MILLIGRAM(S): at 21:30

## 2022-09-15 RX ADMIN — Medication 650 MILLIGRAM(S): at 00:10

## 2022-09-15 RX ADMIN — PHENYLEPHRINE-SHARK LIVER OIL-MINERAL OIL-PETROLATUM RECTAL OINTMENT 1 APPLICATION(S): at 08:35

## 2022-09-15 RX ADMIN — Medication 650 MILLIGRAM(S): at 20:50

## 2022-09-15 RX ADMIN — Medication 650 MILLIGRAM(S): at 06:38

## 2022-09-15 RX ADMIN — OXCARBAZEPINE 300 MILLIGRAM(S): 300 TABLET, FILM COATED ORAL at 08:37

## 2022-09-15 RX ADMIN — LOSARTAN POTASSIUM 50 MILLIGRAM(S): 100 TABLET, FILM COATED ORAL at 06:36

## 2022-09-15 RX ADMIN — OXCARBAZEPINE 300 MILLIGRAM(S): 300 TABLET, FILM COATED ORAL at 17:29

## 2022-09-16 LAB
ALBUMIN SERPL ELPH-MCNC: 3.5 G/DL — SIGNIFICANT CHANGE UP (ref 3.3–5)
ALP SERPL-CCNC: 93 U/L — SIGNIFICANT CHANGE UP (ref 30–120)
ALT FLD-CCNC: 22 U/L DA — SIGNIFICANT CHANGE UP (ref 10–60)
ANION GAP SERPL CALC-SCNC: 12 MMOL/L — SIGNIFICANT CHANGE UP (ref 5–17)
AST SERPL-CCNC: 22 U/L — SIGNIFICANT CHANGE UP (ref 10–40)
BASOPHILS # BLD AUTO: 0.04 K/UL — SIGNIFICANT CHANGE UP (ref 0–0.2)
BASOPHILS NFR BLD AUTO: 0.5 % — SIGNIFICANT CHANGE UP (ref 0–2)
BILIRUB SERPL-MCNC: 0.3 MG/DL — SIGNIFICANT CHANGE UP (ref 0.2–1.2)
BUN SERPL-MCNC: 10 MG/DL — SIGNIFICANT CHANGE UP (ref 7–23)
CALCIUM SERPL-MCNC: 9 MG/DL — SIGNIFICANT CHANGE UP (ref 8.4–10.5)
CHLORIDE SERPL-SCNC: 97 MMOL/L — SIGNIFICANT CHANGE UP (ref 96–108)
CO2 SERPL-SCNC: 24 MMOL/L — SIGNIFICANT CHANGE UP (ref 22–31)
CREAT SERPL-MCNC: 0.56 MG/DL — SIGNIFICANT CHANGE UP (ref 0.5–1.3)
EGFR: 92 ML/MIN/1.73M2 — SIGNIFICANT CHANGE UP
EOSINOPHIL # BLD AUTO: 0.38 K/UL — SIGNIFICANT CHANGE UP (ref 0–0.5)
EOSINOPHIL NFR BLD AUTO: 4.6 % — SIGNIFICANT CHANGE UP (ref 0–6)
GLUCOSE SERPL-MCNC: 125 MG/DL — HIGH (ref 70–99)
HCT VFR BLD CALC: 43.8 % — SIGNIFICANT CHANGE UP (ref 34.5–45)
HGB BLD-MCNC: 14.6 G/DL — SIGNIFICANT CHANGE UP (ref 11.5–15.5)
IMM GRANULOCYTES NFR BLD AUTO: 0.5 % — SIGNIFICANT CHANGE UP (ref 0–0.9)
LYMPHOCYTES # BLD AUTO: 2.22 K/UL — SIGNIFICANT CHANGE UP (ref 1–3.3)
LYMPHOCYTES # BLD AUTO: 26.7 % — SIGNIFICANT CHANGE UP (ref 13–44)
MCHC RBC-ENTMCNC: 28 PG — SIGNIFICANT CHANGE UP (ref 27–34)
MCHC RBC-ENTMCNC: 33.3 GM/DL — SIGNIFICANT CHANGE UP (ref 32–36)
MCV RBC AUTO: 83.9 FL — SIGNIFICANT CHANGE UP (ref 80–100)
MONOCYTES # BLD AUTO: 0.7 K/UL — SIGNIFICANT CHANGE UP (ref 0–0.9)
MONOCYTES NFR BLD AUTO: 8.4 % — SIGNIFICANT CHANGE UP (ref 2–14)
NEUTROPHILS # BLD AUTO: 4.93 K/UL — SIGNIFICANT CHANGE UP (ref 1.8–7.4)
NEUTROPHILS NFR BLD AUTO: 59.3 % — SIGNIFICANT CHANGE UP (ref 43–77)
NRBC # BLD: 0 /100 WBCS — SIGNIFICANT CHANGE UP (ref 0–0)
PLATELET # BLD AUTO: 339 K/UL — SIGNIFICANT CHANGE UP (ref 150–400)
POTASSIUM SERPL-MCNC: 3.7 MMOL/L — SIGNIFICANT CHANGE UP (ref 3.5–5.3)
POTASSIUM SERPL-SCNC: 3.7 MMOL/L — SIGNIFICANT CHANGE UP (ref 3.5–5.3)
PROT SERPL-MCNC: 7.3 G/DL — SIGNIFICANT CHANGE UP (ref 6–8.3)
RBC # BLD: 5.22 M/UL — HIGH (ref 3.8–5.2)
RBC # FLD: 14.6 % — HIGH (ref 10.3–14.5)
SODIUM SERPL-SCNC: 133 MMOL/L — LOW (ref 135–145)
WBC # BLD: 8.31 K/UL — SIGNIFICANT CHANGE UP (ref 3.8–10.5)
WBC # FLD AUTO: 8.31 K/UL — SIGNIFICANT CHANGE UP (ref 3.8–10.5)

## 2022-09-16 PROCEDURE — 99232 SBSQ HOSP IP/OBS MODERATE 35: CPT

## 2022-09-16 RX ORDER — LOSARTAN POTASSIUM 100 MG/1
50 TABLET, FILM COATED ORAL ONCE
Refills: 0 | Status: COMPLETED | OUTPATIENT
Start: 2022-09-16 | End: 2022-09-16

## 2022-09-16 RX ORDER — HYDRALAZINE HCL 50 MG
10 TABLET ORAL EVERY 8 HOURS
Refills: 0 | Status: DISCONTINUED | OUTPATIENT
Start: 2022-09-16 | End: 2022-09-17

## 2022-09-16 RX ORDER — AMLODIPINE BESYLATE 2.5 MG/1
10 TABLET ORAL DAILY
Refills: 0 | Status: DISCONTINUED | OUTPATIENT
Start: 2022-09-16 | End: 2022-09-17

## 2022-09-16 RX ORDER — LOSARTAN POTASSIUM 100 MG/1
100 TABLET, FILM COATED ORAL DAILY
Refills: 0 | Status: DISCONTINUED | OUTPATIENT
Start: 2022-09-16 | End: 2022-09-17

## 2022-09-16 RX ADMIN — OXCARBAZEPINE 300 MILLIGRAM(S): 300 TABLET, FILM COATED ORAL at 13:09

## 2022-09-16 RX ADMIN — SENNA PLUS 2 TABLET(S): 8.6 TABLET ORAL at 21:38

## 2022-09-16 RX ADMIN — AMLODIPINE BESYLATE 10 MILLIGRAM(S): 2.5 TABLET ORAL at 17:45

## 2022-09-16 RX ADMIN — OXCARBAZEPINE 300 MILLIGRAM(S): 300 TABLET, FILM COATED ORAL at 21:38

## 2022-09-16 RX ADMIN — LOSARTAN POTASSIUM 50 MILLIGRAM(S): 100 TABLET, FILM COATED ORAL at 06:32

## 2022-09-16 RX ADMIN — PHENYLEPHRINE-SHARK LIVER OIL-MINERAL OIL-PETROLATUM RECTAL OINTMENT 1 APPLICATION(S): at 17:48

## 2022-09-16 RX ADMIN — OXCARBAZEPINE 300 MILLIGRAM(S): 300 TABLET, FILM COATED ORAL at 06:31

## 2022-09-16 RX ADMIN — PHENYLEPHRINE-SHARK LIVER OIL-MINERAL OIL-PETROLATUM RECTAL OINTMENT 1 APPLICATION(S): at 08:42

## 2022-09-16 RX ADMIN — AMLODIPINE BESYLATE 5 MILLIGRAM(S): 2.5 TABLET ORAL at 06:31

## 2022-09-16 RX ADMIN — POLYETHYLENE GLYCOL 3350 17 GRAM(S): 17 POWDER, FOR SOLUTION ORAL at 21:38

## 2022-09-16 RX ADMIN — LOSARTAN POTASSIUM 50 MILLIGRAM(S): 100 TABLET, FILM COATED ORAL at 13:54

## 2022-09-16 NOTE — CONSULT NOTE ADULT - ASSESSMENT
82-year-old woman presenting to the emergency room with recurrent and persistent rectal discomfort.  Previously seen by her gastroenterologist with recommendations for outpatient follow-up with colorectal surgery.  Patient was seen in the emergency room several days ago with similar symptoms again with similar recommendations but returned again today with persistent diarrhea upon constipation and rectal fullness.    Labs and imaging have been personally reviewed.  Findings discussed with the patient.  CT scan demonstrates minimal rectal distention with mild wall thickening and constipation suspicious for fecal impaction.    GI consultation has been called who says the patient will be seen tomorrow.  They recommended a surgical evaluation.    No acute surgical intervention is indicated.  Continue with stool softeners and enemas until clear.  If the patient fails to evacuate completely GI should consider flexible sigmoidoscopy for disimpaction.  
The patient is an 82 year old female with a history of HTN who presents with rectal pain.    Plan:  - Elevated BPs likely multifactorial from pain, anxiety, and not receiving usual dose of home medications  - Continue losartan 100 mg daily  - Continue amlodipine 5 mg daily  - If BPs remain elevated, can add labetalol 100 mg bid  - Pain control
Fecal Impaction?/Rectal Pain/Constipation  aggressive bowel regimen  liquid diet  may need CRS eval if rectal pain persist --- concerning for anal fissure vs thrombosed hemorrhoid

## 2022-09-16 NOTE — CONSULT NOTE ADULT - SUBJECTIVE AND OBJECTIVE BOX
Chief Complaint:  Patient is a 82y old  Female who presents with a chief complaint of   Hypertension    History of cataract surgery       HPI:  82-year-old female with history of hypertension presents with diarrhea and rectal pain.  Patient reports started having diarrhea 6 days ago.  Ate Burger Moody the day before and unsure if related.  Started to have mild rectal pain that day, rectal pain has progressively getting worse.  States diarrhea very loose in nature.  No noticed blood in her diarrhea.  No fevers.  Was seen at urgent care 2 days ago and told rectal pain likely due to hemorrhoid.  Was given rectal steroid cream.  Was seen by GI yesterday and was given an anesthetic cream and another steroid cream.  Was recommended to follow-up with colorectal surgery.  Unable to make appointment for 2 months.  Was seen at this emergency room last night for her rectal pain.  Patient was discussed with Dr. Hobbs by ED attending. told at that time likely nothing else to do.  Recommended to follow-up with colorectal surgery.  Patient reports having a hard time sleeping last night due to the rectal pain and discomfort.  Slight abdominal discomfort the past 2 days.  States mild crampy in nature.  Slight nausea since yesterday, no vomiting.  No recent antibiotic use.  No foreign travels.  No known sick exposures.  Previous abdominal surgery include cholecystectomy.PCP Carmen Gomez    had CT abd in ED < from: CT Abdomen and Pelvis w/ IV Cont (09.13.22 @ 12:08) >  Constipated colon with stercoral proctocolitis  1.5 x 1.2 cm pancreatic head cystic lesion. Recommend MRI   Small sliding hiatus hernia with thickened distal esophagus. Recommend   endoscopy.  received enema and disimpaction tried by ED physician and GI consult called   (13 Sep 2022 15:13)      No Known Allergies      acetaminophen     Tablet .. 650 milliGRAM(s) Oral every 6 hours PRN  bisacodyl Suppository 10 milliGRAM(s) Rectal daily PRN  losartan 50 milliGRAM(s) Oral daily  polyethylene glycol 3350 17 Gram(s) Oral daily PRN  polyethylene glycol/electrolyte Solution 1000 milliLiter(s) Oral once  senna 2 Tablet(s) Oral at bedtime        FAMILY HISTORY:        Review of Systems:    General:  No wt loss, fevers, chills, night sweats,fatigue,   Eyes:  Good vision, no reported pain  ENT:  No sore throat, pain, runny nose, dysphagia  CV:  No pain, palpitatioins, hypo/hypertension  Resp:  No dyspnea, cough, tachypnea, wheezing  :  No pain, bleeding, incontinence, nocturia  Muscle:  No pain, weakness  Neuro:  No weakness, tingling, memory problems  Psych:  No fatigue, insomnia, mood problems, depression  Endocrine:  No polyuria, polydypsia, cold/heat intolerance  Heme:  No petechiae, ecchymosis, easy bruisability  Skin:  No rash, tattoos, scars, edema    Relevant Family History:       Relevant Social History:       Physical Exam:    Vital Signs:  Vital Signs Last 24 Hrs  T(C): 36.9 (13 Sep 2022 16:45), Max: 37 (13 Sep 2022 09:52)  T(F): 98.5 (13 Sep 2022 16:45), Max: 98.6 (13 Sep 2022 09:52)  HR: 72 (13 Sep 2022 16:45) (72 - 100)  BP: 167/77 (13 Sep 2022 16:45) (167/77 - 168/98)  BP(mean): --  RR: 18 (13 Sep 2022 16:45) (18 - 20)  SpO2: 94% (13 Sep 2022 16:45) (94% - 95%)    Parameters below as of 13 Sep 2022 16:45  Patient On (Oxygen Delivery Method): room air      Daily Height in cm: 162.56 (13 Sep 2022 09:52)    Daily     General:  Appears stated age, well-groomed, well-nourished, no distress  HEENT:  NC/AT,  conjunctivae clear and pink, no thyromegaly, nodules, adenopathy, no JVD  Chest:  Full & symmetric excursion, no increased effort, breath sounds clear  Cardiovascular:  Regular rhythm, S1, S2, no murmur/rub/S3/S4, no abdominal bruit, no edema  Abdomen:  Soft, non-tender, non-distended, normoactive bowel sounds,  no masses ,no hepatosplenomeagaly, no signs of chronic liver disease  Extremities:  no cyanosis,clubbing or edema  Skin:  No rash/erythema/ecchymoses/petechiae/wounds/abscess/warm/dry  Neuro/Psych:  Alert, oriented, no asterixis, no tremor, no encephalopathy    Laboratory:                            13.6   9.14  )-----------( 289      ( 13 Sep 2022 10:52 )             40.4     09-13    129<L>  |  94<L>  |  12  ----------------------------<  139<H>  4.0   |  28  |  0.66    Ca    8.8      13 Sep 2022 10:52    TPro  7.4  /  Alb  3.7  /  TBili  0.4  /  DBili  x   /  AST  18  /  ALT  23  /  AlkPhos  96  09-13    LIVER FUNCTIONS - ( 13 Sep 2022 10:52 )  Alb: 3.7 g/dL / Pro: 7.4 g/dL / ALK PHOS: 96 U/L / ALT: 23 U/L DA / AST: 18 U/L / GGT: x               Amylase Serum--      Lipase serum37       Ammonia--    Imaging:          
History of Present Illness: The patient is an 82 year old female with a history of HTN who presents with rectal pain. She has had constipation, rectal pain, and RLQ abdominal pain. This has improved since admission. She was noted to have elevated BPs up to 200 systolic. She states she takes amlodipine 5 mg qhs and losartan 100 mg qam. No chest pain, shortness of breath, headache.    Past Medical/Surgical History:  HTN    Medications:  Home Medications:  losartan 50 mg oral tablet: 1 tab(s) orally once a day (13 Sep 2022 19:00)  Norvasc 5 mg oral tablet:  (13 Sep 2022 19:00)  OXcarbazepine 300 mg oral tablet:  (13 Sep 2022 19:00)      Family History: Non-contributory family history of premature cardiovascular atherosclerotic disease    Social History: No tobacco, alcohol or drug use    Review of Systems:  General: No fevers, chills, weight gain  Skin: No rashes, color changes  Cardiovascular: No chest pain, orthopnea  Respiratory: No shortness of breath, cough  Gastrointestinal: No nausea, abdominal pain  Genitourinary: No incontinence, pain with urination  Musculoskeletal: No pain, swelling, decreased range of motion  Neurological: No headache, weakness  Psychiatric: No depression, anxiety  Endocrine: No weight gain, increased thirst  All other systems are comprehensively negative.    Physical Exam:  Vitals:        Vital Signs Last 24 Hrs  T(C): 36.4 (16 Sep 2022 08:34), Max: 36.5 (15 Sep 2022 23:10)  T(F): 97.6 (16 Sep 2022 08:34), Max: 97.7 (15 Sep 2022 23:10)  HR: 69 (16 Sep 2022 09:21) (69 - 86)  BP: 171/82 (16 Sep 2022 09:21) (158/78 - 205/106)  BP(mean): --  RR: 18 (16 Sep 2022 09:21) (18 - 18)  SpO2: 96% (16 Sep 2022 09:21) (96% - 96%)  Parameters below as of 16 Sep 2022 09:21  Patient On (Oxygen Delivery Method): room air  General: NAD  HEENT: MMM  Neck: No JVD, no carotid bruit  Lungs: CTAB  CV: RRR, nl S1/S2, no M/R/G  Abdomen: S/NT/ND, +BS  Extremities: No LE edema, no cyanosis  Neuro: AAOx3, non-focal  Skin: No rash    Labs:                        14.6   8.31  )-----------( 339      ( 16 Sep 2022 06:30 )             43.8     09-16    133<L>  |  97  |  10  ----------------------------<  125<H>  3.7   |  24  |  0.56    Ca    9.0      16 Sep 2022 06:30    TPro  7.3  /  Alb  3.5  /  TBili  0.3  /  DBili  x   /  AST  22  /  ALT  22  /  AlkPhos  93  09-16            ECG: NSR, normal axis, no ST abnormality    
HPI:  82-year-old female with history of hypertension presents with diarrhea and rectal pain.  Patient reports started having diarrhea 6 days ago.  Ate Burger Moody the day before and unsure if related.  Started to have mild rectal pain that day, rectal pain has progressively getting worse.  States diarrhea very loose in nature.  No noticed blood in her diarrhea.  No fevers.  Was seen at urgent care 2 days ago and told rectal pain likely due to hemorrhoid.  Was given rectal steroid cream.  Was seen by GI yesterday and was given an anesthetic cream and another steroid cream.  Was recommended to follow-up with colorectal surgery.  Unable to make appointment for 2 months.  Was seen at this emergency room last night for her rectal pain.  Patient was discussed with Dr. Hobbs by ED attending. told at that time likely nothing else to do.  Recommended to follow-up with colorectal surgery.  Patient reports having a hard time sleeping last night due to the rectal pain and discomfort.  Slight abdominal discomfort the past 2 days.  States mild crampy in nature.  Slight nausea since yesterday, no vomiting.  No recent antibiotic use.  No foreign travels.  No known sick exposures.  Previous abdominal surgery include cholecystectomy.PCP Carmen Mckeon  had CT abd in ED < from: CT Abdomen and Pelvis w/ IV Cont (09.13.22 @ 12:08) >  Constipated colon with stercoral proctocolitis  1.5 x 1.2 cm pancreatic head cystic lesion. Recommend MRI   Small sliding hiatus hernia with thickened distal esophagus. Recommend   endoscopy.  received enema and disimpaction tried by ED physician and GI consult called   (13 Sep 2022 15:13)      PAST MEDICAL & SURGICAL HISTORY:  Hypertension      History of cataract surgery          MEDICATIONS  (STANDING):  losartan 50 milliGRAM(s) Oral daily  polyethylene glycol/electrolyte Solution 1000 milliLiter(s) Oral once  senna 2 Tablet(s) Oral at bedtime    MEDICATIONS  (PRN):  acetaminophen     Tablet .. 650 milliGRAM(s) Oral every 6 hours PRN Temp greater or equal to 38C (100.4F), Mild Pain (1 - 3)  bisacodyl Suppository 10 milliGRAM(s) Rectal daily PRN Constipation  polyethylene glycol 3350 17 Gram(s) Oral daily PRN Constipation      Allergies    No Known Allergies    Intolerances        SOCIAL HISTORY:    FAMILY HISTORY:      Vital Signs Last 24 Hrs  T(C): 36.9 (13 Sep 2022 16:45), Max: 37 (13 Sep 2022 09:52)  T(F): 98.5 (13 Sep 2022 16:45), Max: 98.6 (13 Sep 2022 09:52)  HR: 72 (13 Sep 2022 16:45) (72 - 100)  BP: 167/77 (13 Sep 2022 16:45) (148/78 - 168/98)  BP(mean): --  RR: 18 (13 Sep 2022 16:45) (16 - 20)  SpO2: 94% (13 Sep 2022 16:45) (94% - 99%)    Parameters below as of 13 Sep 2022 16:45  Patient On (Oxygen Delivery Method): room air        LABS:                        13.6   9.14  )-----------( 289      ( 13 Sep 2022 10:52 )             40.4     09-13    129<L>  |  94<L>  |  12  ----------------------------<  139<H>  4.0   |  28  |  0.66    Ca    8.8      13 Sep 2022 10:52    TPro  7.4  /  Alb  3.7  /  TBili  0.4  /  DBili  x   /  AST  18  /  ALT  23  /  AlkPhos  96  09-13          RADIOLOGY & ADDITIONAL STUDIES:  ACC: [id number] EXAM:  CT ABDOMEN AND PELVIS IC                          PROCEDURE DATE:  09/13/2022          INTERPRETATION:  CLINICAL INFORMATION: 82 years  Female with abd pain,   diarrhea, rectal pain.    COMPARISON: None.    CONTRAST/COMPLICATIONS:  IV Contrast: Omnipaque 350  90 cc administered   10 cc discarded  Oral Contrast: NONE  Complications: None reported at time of study completion    PROCEDURE:  CT of the Abdomen and Pelvis was performed.  Sagittal and coronal reformats were performed.    FINDINGS:  LOWER CHEST: Right middle lobe, lingular and right lower lobe discoid   atelectasis.    LIVER: Hepatomegaly. Right lobe 19.6 cm.  BILE DUCTS: 1 cm common duct postcholecystectomy.  GALLBLADDER: Cholecystectomy.  SPLEEN: Within normal limits.  PANCREAS: 1.5 x 1.2 cm pancreatic head cystic lesion.  ADRENALS: Within normal limits.  KIDNEYS/URETERS: 8.5 cm left upper pole cyst. 3.4 cm right upper pole   cyst. Bilateral parapelvic cysts and cortical hypodensities too small to   characterize. No gross hydronephrosis.    BLADDER: Within normal limits.  REPRODUCTIVE ORGANS: Unremarkable uterus.    BOWEL: No bowel obstruction. Appendix is normal.. Small sliding hiatus   hernia with thickened distal esophagus. Moderate colonic stool burden.   Rectum distended to 7.4 cm. Perirectal edema and edema around the distal   sigmoid colon consistent with stercoral proctocolitis.  PERITONEUM: No ascites.  VESSELS: Within normal limits.  RETROPERITONEUM/LYMPH NODES: No lymphadenopathy.  ABDOMINAL WALL: Within normal limits.  BONES: Within normal limits.    IMPRESSION:    Constipated colon with stercoral proctocolitis.    1.5 x 1.2 cm pancreatic head cystic lesion. Recommend MRI for   characterization.    Small sliding hiatus hernia with thickened distal esophagus. Recommend   endoscopy.    Other chronic findings as above.        --- End of Report ---        MORGAN CHOI MD; Attending Radiologist  This document has been electronically signed. Sep 13 2022 12:21PM

## 2022-09-17 ENCOUNTER — TRANSCRIPTION ENCOUNTER (OUTPATIENT)
Age: 82
End: 2022-09-17

## 2022-09-17 VITALS — DIASTOLIC BLOOD PRESSURE: 84 MMHG | SYSTOLIC BLOOD PRESSURE: 160 MMHG | OXYGEN SATURATION: 97 % | HEART RATE: 93 BPM

## 2022-09-17 PROCEDURE — 74177 CT ABD & PELVIS W/CONTRAST: CPT | Mod: MA

## 2022-09-17 PROCEDURE — 84436 ASSAY OF TOTAL THYROXINE: CPT

## 2022-09-17 PROCEDURE — 93005 ELECTROCARDIOGRAM TRACING: CPT

## 2022-09-17 PROCEDURE — 97116 GAIT TRAINING THERAPY: CPT

## 2022-09-17 PROCEDURE — 84443 ASSAY THYROID STIM HORMONE: CPT

## 2022-09-17 PROCEDURE — 87507 IADNA-DNA/RNA PROBE TQ 12-25: CPT

## 2022-09-17 PROCEDURE — 84480 ASSAY TRIIODOTHYRONINE (T3): CPT

## 2022-09-17 PROCEDURE — 99282 EMERGENCY DEPT VISIT SF MDM: CPT

## 2022-09-17 PROCEDURE — 96361 HYDRATE IV INFUSION ADD-ON: CPT

## 2022-09-17 PROCEDURE — 97110 THERAPEUTIC EXERCISES: CPT

## 2022-09-17 PROCEDURE — 85025 COMPLETE CBC W/AUTO DIFF WBC: CPT

## 2022-09-17 PROCEDURE — 96374 THER/PROPH/DIAG INJ IV PUSH: CPT

## 2022-09-17 PROCEDURE — 99285 EMERGENCY DEPT VISIT HI MDM: CPT

## 2022-09-17 PROCEDURE — 96375 TX/PRO/DX INJ NEW DRUG ADDON: CPT

## 2022-09-17 PROCEDURE — 97161 PT EVAL LOW COMPLEX 20 MIN: CPT

## 2022-09-17 PROCEDURE — 80061 LIPID PANEL: CPT

## 2022-09-17 PROCEDURE — 87635 SARS-COV-2 COVID-19 AMP PRB: CPT

## 2022-09-17 PROCEDURE — 80053 COMPREHEN METABOLIC PANEL: CPT

## 2022-09-17 PROCEDURE — 71045 X-RAY EXAM CHEST 1 VIEW: CPT

## 2022-09-17 PROCEDURE — 36415 COLL VENOUS BLD VENIPUNCTURE: CPT

## 2022-09-17 PROCEDURE — 83690 ASSAY OF LIPASE: CPT

## 2022-09-17 RX ORDER — POLYETHYLENE GLYCOL 3350 17 G/17G
17 POWDER, FOR SOLUTION ORAL
Qty: 510 | Refills: 0
Start: 2022-09-17 | End: 2022-10-16

## 2022-09-17 RX ORDER — SENNA PLUS 8.6 MG/1
2 TABLET ORAL
Qty: 60 | Refills: 0
Start: 2022-09-17 | End: 2022-10-16

## 2022-09-17 RX ORDER — LOSARTAN POTASSIUM 100 MG/1
1 TABLET, FILM COATED ORAL
Qty: 30 | Refills: 0
Start: 2022-09-17 | End: 2022-10-16

## 2022-09-17 RX ORDER — LOSARTAN POTASSIUM 100 MG/1
1 TABLET, FILM COATED ORAL
Qty: 0 | Refills: 0 | DISCHARGE

## 2022-09-17 RX ADMIN — LOSARTAN POTASSIUM 100 MILLIGRAM(S): 100 TABLET, FILM COATED ORAL at 06:18

## 2022-09-17 RX ADMIN — PHENYLEPHRINE-SHARK LIVER OIL-MINERAL OIL-PETROLATUM RECTAL OINTMENT 1 APPLICATION(S): at 06:19

## 2022-09-17 RX ADMIN — AMLODIPINE BESYLATE 10 MILLIGRAM(S): 2.5 TABLET ORAL at 06:17

## 2022-09-17 RX ADMIN — OXCARBAZEPINE 300 MILLIGRAM(S): 300 TABLET, FILM COATED ORAL at 09:08

## 2022-09-17 NOTE — DISCHARGE NOTE PROVIDER - CARE PROVIDER_API CALL
Jonah Melo)  ColonRectal Surgery; Surgery  [address] [place] [address]  Phone: [phone]  Fax: [phone]  Follow Up Time:     your pcp,   Phone: (   )    -  Fax: (   )    -  Follow Up Time:

## 2022-09-17 NOTE — DISCHARGE NOTE PROVIDER - HOSPITAL COURSE
82-year-old female with history of hypertension presents with diarrhea and rectal pain.  Patient reports started having diarrhea 6 days ago.  Ate Burger Moody the day before and unsure if related.  Started to have mild rectal pain that day, rectal pain has progressively getting worse.  States diarrhea very loose in nature.  No noticed blood in her diarrhea.  No fevers.  Was seen at urgent care 2 days ago and told rectal pain likely due to hemorrhoid.  Was given rectal steroid cream.  Was seen by GI yesterday and was given an anesthetic cream and another steroid cream.  Was recommended to follow-up with colorectal surgery.  Unable to make appointment for 2 months.  Was seen at this emergency room last night for her rectal pain.  Patient was discussed with Dr. Hobbs by ED attending. told at that time likely nothing else to do.  Recommended to follow-up with colorectal surgery.  Patient reports having a hard time sleeping last night due to the rectal pain and discomfort.  Slight abdominal discomfort the past 2 days.  States mild crampy in nature.  Slight nausea since yesterday, no vomiting.  No recent antibiotic use.  No foreign travels.  No known sick exposures.  Previous abdominal surgery include cholecystectomy.PCP Carmen Mckeon  had CT abd in ED < from: CT Abdomen and Pelvis w/ IV Cont (09.13.22 @ 12:08) >  Constipated colon with stercoral proctocolitis  1.5 x 1.2 cm pancreatic head cystic lesion. Recommend MRI   Small sliding hiatus hernia with thickened distal esophagus. Recommend   endoscopy.  received enema and disimpaction tried by ED physician and GI consult called  admitted for   rectal pain with fecal impaction with proctocolitis with overflow diarrhea  HTN uncontrolled, losartan increased  continue with bowel regimen , topical creams,  hiatal hernia  pancreatic cyst  out pr f up with GI and colorectal surg

## 2022-09-17 NOTE — PROGRESS NOTE ADULT - SUBJECTIVE AND OBJECTIVE BOX
Admitted with constipation, fecal impaction and proctocolitis.  Received multiple modality treatment and has evacuated.  Rectal pressure/discomfort improved.    Vital Signs Last 24 Hrs  T(C): 36.4 (16 Sep 2022 08:34), Max: 36.5 (15 Sep 2022 23:10)  T(F): 97.6 (16 Sep 2022 08:34), Max: 97.7 (15 Sep 2022 23:10)  HR: 69 (16 Sep 2022 09:21) (69 - 86)  BP: 171/82 (16 Sep 2022 09:21) (158/78 - 205/106)  BP(mean): --  RR: 18 (16 Sep 2022 09:21) (18 - 18)  SpO2: 96% (16 Sep 2022 09:21) (96% - 96%)    Parameters below as of 16 Sep 2022 09:21  Patient On (Oxygen Delivery Method): room air                                14.6   8.31  )-----------( 339      ( 16 Sep 2022 06:30 )             43.8                         14.5   9.30  )-----------( 348      ( 15 Sep 2022 08:10 )             43.8                         14.1   9.18  )-----------( 303      ( 14 Sep 2022 06:00 )             43.5       09-16    133<L>  |  97  |  10  ----------------------------<  125<H>  3.7   |  24  |  0.56    Ca    9.0      16 Sep 2022 06:30    TPro  7.3  /  Alb  3.5  /  TBili  0.3  /  DBili  x   /  AST  22  /  ALT  22  /  AlkPhos  93  09-16      Physical Exam:  Awake alert and oriented x3 in no acute distress. NCAT, PERRLA, EOMI  Lungs clear bilaterally without wheezes rhonchi or rales.  Regular rate and rhythm  Abdomen soft, nontender, nondistended, positive bowel sounds in all 4 quadrants. No evidence of hernia or masses.   Extremities without edema or tenderness.
INTERVAL HPI/OVERNIGHT EVENTS:  No new overnight event.  No N/V/D.  Tolerating diet.     MEDICATIONS  (STANDING):  amLODIPine   Tablet 10 milliGRAM(s) Oral daily  enoxaparin Injectable 40 milliGRAM(s) SubCutaneous every 24 hours  hemorrhoidal Ointment 1 Application(s) Rectal two times a day  losartan 100 milliGRAM(s) Oral daily  OXcarbazepine 300 milliGRAM(s) Oral <User Schedule>  senna 2 Tablet(s) Oral at bedtime    MEDICATIONS  (PRN):  acetaminophen     Tablet .. 650 milliGRAM(s) Oral every 6 hours PRN Temp greater or equal to 38C (100.4F), Mild Pain (1 - 3)  ALBUTerol   0.042% 1.25 milliGRAM(s) Nebulizer every 8 hours PRN Bronchospasm  bisacodyl Suppository 10 milliGRAM(s) Rectal daily PRN Constipation  hydrALAZINE Injectable 10 milliGRAM(s) IV Push every 8 hours PRN bp >180  ketorolac   Injectable 15 milliGRAM(s) IV Push every 8 hours PRN Moderate Pain (4 - 6)  polyethylene glycol 3350 17 Gram(s) Oral daily PRN Constipation      Allergies    No Known Allergies    Intolerances        Review of Systems:    General:  No wt loss, fevers, chills, night sweats,fatigue,   Eyes:  Good vision, no reported pain  ENT:  No sore throat, pain, runny nose, dysphagia  CV:  No pain, palpitatioins, hypo/hypertension  Resp:  No dyspnea, cough, tachypnea, wheezing  GI:  No pain, No nausea, No vomiting, No diarrhea, No constipatiion, No weight loss, No fever, No pruritis, No rectal bleeding, No tarry stools, No dysphagia,  :  No pain, bleeding, incontinence, nocturia  Muscle:  No pain, weakness  Neuro:  No weakness, tingling, memory problems  Psych:  No fatigue, insomnia, mood problems, depression  Endocrine:  No polyuria, polydypsia, cold/heat intolerance  Heme:  No petechiae, ecchymosis, easy bruisability  Skin:  No rash, tattoos, scars, edema      Vital Signs Last 24 Hrs  T(C): 36.8 (17 Sep 2022 08:04), Max: 37.2 (16 Sep 2022 23:30)  T(F): 98.3 (17 Sep 2022 08:04), Max: 98.9 (16 Sep 2022 23:30)  HR: 93 (17 Sep 2022 10:15) (80 - 93)  BP: 160/84 (17 Sep 2022 10:15) (143/83 - 170/80)  BP(mean): --  RR: 16 (17 Sep 2022 08:04) (16 - 17)  SpO2: 97% (17 Sep 2022 10:15) (95% - 97%)    Parameters below as of 17 Sep 2022 10:15  Patient On (Oxygen Delivery Method): room air        PHYSICAL EXAM:    Constitutional: NAD, well-developed  HEENT: EOMI, throat clear  Neck: No LAD, supple  Respiratory: CTA and P  Cardiovascular: S1 and S2, RRR, no M  Gastrointestinal: BS+, soft, NT/ND, neg HSM,  Extremities: No peripheral edema, neg clubing, cyanosis  Vascular: 2+ peripheral pulses  Neurological: A/O x 3, no focal deficits  Psychiatric: Normal mood, normal affect  Skin: No rashes      LABS:                        14.6   8.31  )-----------( 339      ( 16 Sep 2022 06:30 )             43.8     09-16    133<L>  |  97  |  10  ----------------------------<  125<H>  3.7   |  24  |  0.56    Ca    9.0      16 Sep 2022 06:30    TPro  7.3  /  Alb  3.5  /  TBili  0.3  /  DBili  x   /  AST  22  /  ALT  22  /  AlkPhos  93  09-16          RADIOLOGY & ADDITIONAL TESTS:  
SURGERY    82 year old female admitted for rectal pain and constipation    SUBJECTIVE:   Patient seen and examined at bedside, continues to complain of rectal discomfort and watery diarrhea.      OBJECTIVE:   T(F): 97.6 (09-14-22 @ 07:44), Max: 98.5 (09-13-22 @ 16:45)  HR: 67 (09-14-22 @ 07:44) (67 - 78)  BP: 170/87 (09-14-22 @ 07:44) (160/75 - 178/92)  RR: 18 (09-14-22 @ 07:44) (17 - 18)  SpO2: 97% (09-14-22 @ 07:44) (94% - 97%)    Physical exam:  General: A+O x 3 in NAD  Chest: Equal chest expansion  Heart: Pulse regular  Abdomen: soft non distended, non tender, BS x 4, no guarding noted  Extremities: no edema noted, warm,  no calf tenderness     MEDICATIONS  (STANDING):  enoxaparin Injectable 40 milliGRAM(s) SubCutaneous every 24 hours  losartan 50 milliGRAM(s) Oral daily  senna 2 Tablet(s) Oral at bedtime    MEDICATIONS  (PRN):  acetaminophen     Tablet .. 650 milliGRAM(s) Oral every 6 hours PRN Temp greater or equal to 38C (100.4F), Mild Pain (1 - 3)  bisacodyl Suppository 10 milliGRAM(s) Rectal daily PRN Constipation  polyethylene glycol 3350 17 Gram(s) Oral daily PRN Constipation      LABS:                        14.1   9.18  )-----------( 303      ( 14 Sep 2022 06:00 )             43.5     09-14    134<L>  |  99  |  12  ----------------------------<  119<H>  3.9   |  26  |  0.58    Ca    8.6      14 Sep 2022 06:00    TPro  7.4  /  Alb  3.7  /  TBili  0.4  /  DBili  x   /  AST  21  /  ALT  27  /  AlkPhos  100  09-14        Assessment  82 year old female admitted for rectal pain and constipation/fecal impaction    No acute surgical intervention is indicated.    continue with bowel regimen and topical creams as per GI   If the patient fails to evacuate completely GI should consider flexible sigmoidoscopy for disimpaction.  Case and plan D/W Dr. Pettit      
INTERVAL HPI/OVERNIGHT EVENTS:  c/o rectal pain    MEDICATIONS  (STANDING):  enoxaparin Injectable 40 milliGRAM(s) SubCutaneous every 24 hours  losartan 50 milliGRAM(s) Oral daily  senna 2 Tablet(s) Oral at bedtime    MEDICATIONS  (PRN):  acetaminophen     Tablet .. 650 milliGRAM(s) Oral every 6 hours PRN Temp greater or equal to 38C (100.4F), Mild Pain (1 - 3)  bisacodyl Suppository 10 milliGRAM(s) Rectal daily PRN Constipation  polyethylene glycol 3350 17 Gram(s) Oral daily PRN Constipation      Allergies    No Known Allergies    Intolerances        Review of Systems:    General:  No wt loss, fevers, chills, night sweats,fatigue,   Eyes:  Good vision, no reported pain  ENT:  No sore throat, pain, runny nose, dysphagia  CV:  No pain, palpitatioins, hypo/hypertension  Resp:  No dyspnea, cough, tachypnea, wheezing  GI:  No pain, No nausea, No vomiting, No diarrhea, No constipatiion, No weight loss, No fever, No pruritis, No rectal bleeding, No tarry stools, No dysphagia,  :  No pain, bleeding, incontinence, nocturia  Muscle:  No pain, weakness  Neuro:  No weakness, tingling, memory problems  Psych:  No fatigue, insomnia, mood problems, depression  Endocrine:  No polyuria, polydypsia, cold/heat intolerance  Heme:  No petechiae, ecchymosis, easy bruisability  Skin:  No rash, tattoos, scars, edema      Vital Signs Last 24 Hrs  T(C): 36.4 (14 Sep 2022 07:44), Max: 36.9 (13 Sep 2022 16:45)  T(F): 97.6 (14 Sep 2022 07:44), Max: 98.5 (13 Sep 2022 16:45)  HR: 67 (14 Sep 2022 07:44) (67 - 78)  BP: 170/87 (14 Sep 2022 07:44) (160/75 - 178/92)  BP(mean): --  RR: 18 (14 Sep 2022 07:44) (17 - 18)  SpO2: 97% (14 Sep 2022 07:44) (94% - 97%)    Parameters below as of 14 Sep 2022 07:44  Patient On (Oxygen Delivery Method): room air        PHYSICAL EXAM:    Constitutional: NAD, well-developed  HEENT: EOMI, throat clear  Neck: No LAD, supple  Respiratory: CTA and P  Cardiovascular: S1 and S2, RRR, no M  Gastrointestinal: BS+, soft, NT/ND, neg HSM,  Extremities: No peripheral edema, neg clubing, cyanosis  Vascular: 2+ peripheral pulses  Neurological: A/O x 3, no focal deficits  Psychiatric: Normal mood, normal affect  Skin: No rashes      LABS:                        14.1   9.18  )-----------( 303      ( 14 Sep 2022 06:00 )             43.5     09-14    134<L>  |  99  |  12  ----------------------------<  119<H>  3.9   |  26  |  0.58    Ca    8.6      14 Sep 2022 06:00    TPro  7.4  /  Alb  3.7  /  TBili  0.4  /  DBili  x   /  AST  21  /  ALT  27  /  AlkPhos  100  09-14          RADIOLOGY & ADDITIONAL TESTS:  
INTERVAL HPI/OVERNIGHT EVENTS:  feels better    MEDICATIONS  (STANDING):  amLODIPine   Tablet 5 milliGRAM(s) Oral daily  enoxaparin Injectable 40 milliGRAM(s) SubCutaneous every 24 hours  hemorrhoidal Ointment 1 Application(s) Rectal two times a day  losartan 50 milliGRAM(s) Oral daily  OXcarbazepine 300 milliGRAM(s) Oral two times a day  senna 2 Tablet(s) Oral at bedtime    MEDICATIONS  (PRN):  acetaminophen     Tablet .. 650 milliGRAM(s) Oral every 6 hours PRN Temp greater or equal to 38C (100.4F), Mild Pain (1 - 3)  ALBUTerol   0.042% 1.25 milliGRAM(s) Nebulizer every 8 hours PRN Bronchospasm  bisacodyl Suppository 10 milliGRAM(s) Rectal daily PRN Constipation  ketorolac   Injectable 15 milliGRAM(s) IV Push every 8 hours PRN Moderate Pain (4 - 6)  polyethylene glycol 3350 17 Gram(s) Oral daily PRN Constipation      Allergies    No Known Allergies    Intolerances        Review of Systems:    General:  No wt loss, fevers, chills, night sweats,fatigue,   Eyes:  Good vision, no reported pain  ENT:  No sore throat, pain, runny nose, dysphagia  CV:  No pain, palpitatioins, hypo/hypertension  Resp:  No dyspnea, cough, tachypnea, wheezing  GI:  No pain, No nausea, No vomiting, No diarrhea, No constipatiion, No weight loss, No fever, No pruritis, No rectal bleeding, No tarry stools, No dysphagia,  :  No pain, bleeding, incontinence, nocturia  Muscle:  No pain, weakness  Neuro:  No weakness, tingling, memory problems  Psych:  No fatigue, insomnia, mood problems, depression  Endocrine:  No polyuria, polydypsia, cold/heat intolerance  Heme:  No petechiae, ecchymosis, easy bruisability  Skin:  No rash, tattoos, scars, edema      Vital Signs Last 24 Hrs  T(C): 36.4 (15 Sep 2022 07:48), Max: 37.1 (14 Sep 2022 23:15)  T(F): 97.6 (15 Sep 2022 07:48), Max: 98.7 (14 Sep 2022 23:15)  HR: 103 (15 Sep 2022 08:40) (68 - 103)  BP: 161/88 (15 Sep 2022 08:40) (161/88 - 183/109)  BP(mean): --  RR: 18 (15 Sep 2022 07:48) (18 - 18)  SpO2: 95% (15 Sep 2022 07:48) (93% - 97%)    Parameters below as of 15 Sep 2022 07:48  Patient On (Oxygen Delivery Method): room air        PHYSICAL EXAM:    Constitutional: NAD, well-developed  HEENT: EOMI, throat clear  Neck: No LAD, supple  Respiratory: CTA and P  Cardiovascular: S1 and S2, RRR, no M  Gastrointestinal: BS+, soft, NT/ND, neg HSM,  Extremities: No peripheral edema, neg clubing, cyanosis  Vascular: 2+ peripheral pulses  Neurological: A/O x 3, no focal deficits  Psychiatric: Normal mood, normal affect  Skin: No rashes      LABS:                        14.5   9.30  )-----------( 348      ( 15 Sep 2022 08:10 )             43.8     09-15    138  |  99  |  9   ----------------------------<  129<H>  3.6   |  32<H>  |  0.63    Ca    8.9      15 Sep 2022 08:10    TPro  7.2  /  Alb  3.9  /  TBili  0.4  /  DBili  x   /  AST  20  /  ALT  28  /  AlkPhos  107  09-15          RADIOLOGY & ADDITIONAL TESTS:  
INTERVAL HPI/OVERNIGHT EVENTS:  no new complaints    MEDICATIONS  (STANDING):  amLODIPine   Tablet 5 milliGRAM(s) Oral daily  enoxaparin Injectable 40 milliGRAM(s) SubCutaneous every 24 hours  hemorrhoidal Ointment 1 Application(s) Rectal two times a day  losartan 50 milliGRAM(s) Oral daily  OXcarbazepine 300 milliGRAM(s) Oral <User Schedule>  senna 2 Tablet(s) Oral at bedtime    MEDICATIONS  (PRN):  acetaminophen     Tablet .. 650 milliGRAM(s) Oral every 6 hours PRN Temp greater or equal to 38C (100.4F), Mild Pain (1 - 3)  ALBUTerol   0.042% 1.25 milliGRAM(s) Nebulizer every 8 hours PRN Bronchospasm  bisacodyl Suppository 10 milliGRAM(s) Rectal daily PRN Constipation  ketorolac   Injectable 15 milliGRAM(s) IV Push every 8 hours PRN Moderate Pain (4 - 6)  polyethylene glycol 3350 17 Gram(s) Oral daily PRN Constipation      Allergies    No Known Allergies    Intolerances        Review of Systems:    General:  No wt loss, fevers, chills, night sweats,fatigue,   Eyes:  Good vision, no reported pain  ENT:  No sore throat, pain, runny nose, dysphagia  CV:  No pain, palpitatioins, hypo/hypertension  Resp:  No dyspnea, cough, tachypnea, wheezing  GI:  No pain, No nausea, No vomiting, No diarrhea, No constipatiion, No weight loss, No fever, No pruritis, No rectal bleeding, No tarry stools, No dysphagia,  :  No pain, bleeding, incontinence, nocturia  Muscle:  No pain, weakness  Neuro:  No weakness, tingling, memory problems  Psych:  No fatigue, insomnia, mood problems, depression  Endocrine:  No polyuria, polydypsia, cold/heat intolerance  Heme:  No petechiae, ecchymosis, easy bruisability  Skin:  No rash, tattoos, scars, edema      Vital Signs Last 24 Hrs  T(C): 36.4 (16 Sep 2022 08:34), Max: 36.5 (15 Sep 2022 23:10)  T(F): 97.6 (16 Sep 2022 08:34), Max: 97.7 (15 Sep 2022 23:10)  HR: 69 (16 Sep 2022 09:21) (69 - 86)  BP: 171/82 (16 Sep 2022 09:21) (158/78 - 205/106)  BP(mean): --  RR: 18 (16 Sep 2022 09:21) (18 - 18)  SpO2: 96% (16 Sep 2022 09:21) (96% - 96%)    Parameters below as of 16 Sep 2022 09:21  Patient On (Oxygen Delivery Method): room air        PHYSICAL EXAM:    Constitutional: NAD, well-developed  HEENT: EOMI, throat clear  Neck: No LAD, supple  Respiratory: CTA and P  Cardiovascular: S1 and S2, RRR, no M  Gastrointestinal: BS+, soft, NT/ND, neg HSM,  Extremities: No peripheral edema, neg clubing, cyanosis  Vascular: 2+ peripheral pulses  Neurological: A/O x 3, no focal deficits  Psychiatric: Normal mood, normal affect  Skin: No rashes      LABS:                        14.6   8.31  )-----------( 339      ( 16 Sep 2022 06:30 )             43.8     09-16    133<L>  |  97  |  10  ----------------------------<  125<H>  3.7   |  24  |  0.56    Ca    9.0      16 Sep 2022 06:30    TPro  7.3  /  Alb  3.5  /  TBili  0.3  /  DBili  x   /  AST  22  /  ALT  22  /  AlkPhos  93  09-16          RADIOLOGY & ADDITIONAL TESTS:  
Patient is a 82y Female with a known history of :  Fecal impaction [K56.41]    Overflow diarrhea [R19.7]    Constipation [K59.00]    Benign essential HTN [I10]      HPI:  82-year-old female with history of hypertension presents with diarrhea and rectal pain.  Patient reports started having diarrhea 6 days ago.  Ate [employer] the day before and unsure if related.  Started to have mild rectal pain that day, rectal pain has progressively getting worse.  States diarrhea very loose in nature.  No noticed blood in her diarrhea.  No fevers.  Was seen at urgent care 2 days ago and told rectal pain likely due to hemorrhoid.  Was given rectal steroid cream.  Was seen by GI yesterday and was given an anesthetic cream and another steroid cream.  Was recommended to follow-up with colorectal surgery.  Unable to make appointment for 2 months.  Was seen at this emergency room last night for her rectal pain.  Patient was discussed with Dr. Hobbs by ED attending. told at that time likely nothing else to do.  Recommended to follow-up with colorectal surgery.  Patient reports having a hard time sleeping last night due to the rectal pain and discomfort.  Slight abdominal discomfort the past 2 days.  States mild crampy in nature.  Slight nausea since yesterday, no vomiting.  No recent antibiotic use.  No foreign travels.  No known sick exposures.  Previous abdominal surgery include cholecystectomy.PCP Carmen Mckeon  had CT abd in ED < from: CT Abdomen and Pelvis w/ IV Cont (09.13.22 @ 12:08) >  Constipated colon with stercoral proctocolitis  1.5 x 1.2 cm pancreatic head cystic lesion. Recommend MRI   Small sliding hiatus hernia with thickened distal esophagus. Recommend   endoscopy.  received enema and disimpaction tried by ED physician and GI consult called   (13 Sep 2022 15:13)      REVIEW OF SYSTEMS:    CONSTITUTIONAL: No fever, weight loss, or fatigue  EYES: No eye pain, visual disturbances, or discharge  ENMT:  No difficulty hearing, tinnitus, vertigo; No sinus or throat pain  NECK: No pain or stiffness  BREASTS: No pain, masses, or nipple discharge  RESPIRATORY: No cough, wheezing, chills or hemoptysis; No shortness of breath  CARDIOVASCULAR: No chest pain, palpitations, dizziness, or leg swelling  GASTROINTESTINAL: No abdominal or epigastric pain. No nausea, vomiting, or hematemesis; No diarrhea or constipation. No melena or hematochezia.  GENITOURINARY: No dysuria, frequency, hematuria, or incontinence  NEUROLOGICAL: No headaches, memory loss, loss of strength, numbness, or tremors  SKIN: No itching, burning, rashes, or lesions   LYMPH NODES: No enlarged glands  ENDOCRINE: No heat or cold intolerance; No hair loss  MUSCULOSKELETAL: No joint pain or swelling; No muscle, back, or extremity pain  PSYCHIATRIC: No depression, anxiety, mood swings, or difficulty sleeping  HEME/LYMPH: No easy bruising, or bleeding gums  ALLERGY AND IMMUNOLOGIC: No hives or eczema    MEDICATIONS  (STANDING):  amLODIPine   Tablet 10 milliGRAM(s) Oral daily  enoxaparin Injectable 40 milliGRAM(s) SubCutaneous every 24 hours  hemorrhoidal Ointment 1 Application(s) Rectal two times a day  losartan 100 milliGRAM(s) Oral daily  OXcarbazepine 300 milliGRAM(s) Oral <User Schedule>  senna 2 Tablet(s) Oral at bedtime    MEDICATIONS  (PRN):  acetaminophen     Tablet .. 650 milliGRAM(s) Oral every 6 hours PRN Temp greater or equal to 38C (100.4F), Mild Pain (1 - 3)  ALBUTerol   0.042% 1.25 milliGRAM(s) Nebulizer every 8 hours PRN Bronchospasm  bisacodyl Suppository 10 milliGRAM(s) Rectal daily PRN Constipation  hydrALAZINE Injectable 10 milliGRAM(s) IV Push every 8 hours PRN bp >180  ketorolac   Injectable 15 milliGRAM(s) IV Push every 8 hours PRN Moderate Pain (4 - 6)  polyethylene glycol 3350 17 Gram(s) Oral daily PRN Constipation      ALLERGIES: No Known Allergies      FAMILY HISTORY:      Social history:  Alochol:   Smoking:   Drug Use:   Marital Status:     PHYSICAL EXAMINATION:  -----------------------------  T(C): 36.8 (09-17-22 @ 08:04), Max: 37.2 (09-16-22 @ 23:30)  HR: 93 (09-17-22 @ 10:15) (71 - 93)  BP: 160/84 (09-17-22 @ 10:15) (143/83 - 170/80)  RR: 16 (09-17-22 @ 08:04) (16 - 17)  SpO2: 97% (09-17-22 @ 10:15) (95% - 97%)  Wt(kg): --        Constitutional: well developed, normal appearance, well groomed, well nourished, no deformities and no acute distress.   Eyes: the conjunctiva exhibited no abnormalities and the eyelids demonstrated no xanthelasmas.   HEENT: normal oral mucosa, no oral pallor and no oral cyanosis.   Neck: normal jugular venous A waves present, normal jugular venous V waves present and no jugular venous beck A waves.   Pulmonary: no respiratory distress, normal respiratory rhythm and effort, no accessory muscle use and lungs were clear to auscultation bilaterally.   Cardiovascular: heart rate and rhythm were normal, normal S1 and S2 and no murmur, gallop, rub, heave or thrill are present.   Musculoskeletal: the gait could not be assessed.   Extremities: no clubbing of the fingernails, no localized cyanosis, no petechial hemorrhages and no ischemic changes.   Skin: normal skin color and pigmentation, no rash, no venous stasis, no skin lesions, no skin ulcer and no xanthoma was observed.   Psychiatric: oriented to person, place, and time, the affect was normal, the mood was normal and not feeling anxious.     LABS:   --------  09-16    133<L>  |  97  |  10  ----------------------------<  125<H>  3.7   |  24  |  0.56    Ca    9.0      16 Sep 2022 06:30    TPro  7.3  /  Alb  3.5  /  TBili  0.3  /  DBili  x   /  AST  22  /  ALT  22  /  AlkPhos  93  09-16                         14.6   8.31  )-----------( 339      ( 16 Sep 2022 06:30 )             43.8                   Radiology:    
Patient is a 82y old  Female who presents with a chief complaint of rectal pain with constipation (16 Sep 2022 12:18)      INTERVAL HPI/OVERNIGHT EVENTS:overnight events noted    Home Medications:  losartan 50 mg oral tablet: 1 tab(s) orally once a day (13 Sep 2022 19:00)  Norvasc 5 mg oral tablet:  (13 Sep 2022 19:00)  OXcarbazepine 300 mg oral tablet:  (13 Sep 2022 19:00)      MEDICATIONS  (STANDING):  amLODIPine   Tablet 10 milliGRAM(s) Oral daily  enoxaparin Injectable 40 milliGRAM(s) SubCutaneous every 24 hours  hemorrhoidal Ointment 1 Application(s) Rectal two times a day  losartan 100 milliGRAM(s) Oral daily  OXcarbazepine 300 milliGRAM(s) Oral <User Schedule>  senna 2 Tablet(s) Oral at bedtime    MEDICATIONS  (PRN):  acetaminophen     Tablet .. 650 milliGRAM(s) Oral every 6 hours PRN Temp greater or equal to 38C (100.4F), Mild Pain (1 - 3)  ALBUTerol   0.042% 1.25 milliGRAM(s) Nebulizer every 8 hours PRN Bronchospasm  bisacodyl Suppository 10 milliGRAM(s) Rectal daily PRN Constipation  hydrALAZINE Injectable 10 milliGRAM(s) IV Push every 8 hours PRN bp >180  ketorolac   Injectable 15 milliGRAM(s) IV Push every 8 hours PRN Moderate Pain (4 - 6)  polyethylene glycol 3350 17 Gram(s) Oral daily PRN Constipation      Allergies    No Known Allergies    Intolerances        REVIEW OF SYSTEMS:  CONSTITUTIONAL: No fever, weight loss, or fatigue  EYES: No eye pain, visual disturbances, or discharge  ENMT:  No difficulty hearing, tinnitus, vertigo; No sinus or throat pain  NECK: No pain or stiffness  BREASTS: No pain, masses, or nipple discharge  RESPIRATORY: No cough, wheezing, chills or hemoptysis; No shortness of breath  CARDIOVASCULAR: No chest pain, palpitations, dizziness, or leg swelling  GASTROINTESTINAL: No abdominal or epigastric pain. No nausea, vomiting, or hematemesis; No diarrhea or constipation. No melena or hematochezia.  GENITOURINARY: No dysuria, frequency, hematuria, or incontinence  NEUROLOGICAL: No headaches, memory loss, loss of strength, numbness, or tremors  SKIN: No itching, burning, rashes, or lesions   LYMPH NODES: No enlarged glands  ENDOCRINE: No heat or cold intolerance; No hair loss  MUSCULOSKELETAL: No joint pain or swelling; No muscle, back, or extremity pain  PSYCHIATRIC: No depression, anxiety, mood swings, or difficulty sleeping  HEME/LYMPH: No easy bruising, or bleeding gums  ALLERGY AND IMMUNOLOGIC: No hives or eczema    Vital Signs Last 24 Hrs  T(C): 36.8 (17 Sep 2022 08:04), Max: 37.2 (16 Sep 2022 23:30)  T(F): 98.3 (17 Sep 2022 08:04), Max: 98.9 (16 Sep 2022 23:30)  HR: 93 (17 Sep 2022 10:15) (71 - 93)  BP: 160/84 (17 Sep 2022 10:15) (143/83 - 170/80)  BP(mean): --  RR: 16 (17 Sep 2022 08:04) (16 - 17)  SpO2: 97% (17 Sep 2022 10:15) (95% - 97%)    Parameters below as of 17 Sep 2022 10:15  Patient On (Oxygen Delivery Method): room air        PHYSICAL EXAM:  GENERAL: NAD, well-groomed, well-developed  HEAD:  Atraumatic, Normocephalic  EYES: EOMI, PERRLA, conjunctiva and sclera clear  ENMT: Moist mucous membranes,   NECK: Supple, No JVD, Normal thyroid  NERVOUS SYSTEM:  Alert & Oriented X3, Good concentration; Motor Strength 5/5 B/L upper and lower extremities; DTRs 2+ intact and symmetric  CHEST/LUNG: Clear to percussion bilaterally; No rales, rhonchi, wheezing, or rubs  HEART: Regular rate and rhythm; No murmurs, rubs, or gallops  ABDOMEN: Soft, Nontender, Nondistended; Bowel sounds present  EXTREMITIES:  2+ Peripheral Pulses, No clubbing, cyanosis, or edema  LYMPH: No lymphadenopathy noted  SKIN: No rashes or lesions    LABS:                        14.6   8.31  )-----------( 339      ( 16 Sep 2022 06:30 )             43.8     09-16    133<L>  |  97  |  10  ----------------------------<  125<H>  3.7   |  24  |  0.56    Ca    9.0      16 Sep 2022 06:30    TPro  7.3  /  Alb  3.5  /  TBili  0.3  /  DBili  x   /  AST  22  /  ALT  22  /  AlkPhos  93  09-16        CAPILLARY BLOOD GLUCOSE              I&O's Summary      RADIOLOGY & ADDITIONAL TESTS:    Imaging Personally Reviewed:  [ x] YES  [ ] NO    Consultant(s) Notes Reviewed:  [x ] YES  [ ] NO    Care Discussed with Consultants/Other Providers [x ] YES  [ ] NO
Patient is a 82y old  Female who presents with a chief complaint of rectal pain with constipation (14 Sep 2022 13:24)      INTERVAL HPI/OVERNIGHT EVENTS:overnight events noted    Home Medications:  losartan 50 mg oral tablet: 1 tab(s) orally once a day (13 Sep 2022 19:00)  Norvasc 5 mg oral tablet:  (13 Sep 2022 19:00)  OXcarbazepine 300 mg oral tablet:  (13 Sep 2022 19:00)      MEDICATIONS  (STANDING):  amLODIPine   Tablet 5 milliGRAM(s) Oral daily  enoxaparin Injectable 40 milliGRAM(s) SubCutaneous every 24 hours  hemorrhoidal Ointment 1 Application(s) Rectal two times a day  losartan 50 milliGRAM(s) Oral daily  OXcarbazepine 300 milliGRAM(s) Oral two times a day  senna 2 Tablet(s) Oral at bedtime    MEDICATIONS  (PRN):  acetaminophen     Tablet .. 650 milliGRAM(s) Oral every 6 hours PRN Temp greater or equal to 38C (100.4F), Mild Pain (1 - 3)  ALBUTerol   0.042% 1.25 milliGRAM(s) Nebulizer every 8 hours PRN Bronchospasm  bisacodyl Suppository 10 milliGRAM(s) Rectal daily PRN Constipation  ketorolac   Injectable 15 milliGRAM(s) IV Push every 8 hours PRN Moderate Pain (4 - 6)  polyethylene glycol 3350 17 Gram(s) Oral daily PRN Constipation      Allergies    No Known Allergies    Intolerances        REVIEW OF SYSTEMS:  CONSTITUTIONAL: No fever, weight loss, or fatigue  EYES: No eye pain, visual disturbances, or discharge  ENMT:  No difficulty hearing, tinnitus, vertigo; No sinus or throat pain  NECK: No pain or stiffness  BREASTS: No pain, masses, or nipple discharge  RESPIRATORY: No cough, wheezing, chills or hemoptysis; No shortness of breath  CARDIOVASCULAR: No chest pain, palpitations, dizziness, or leg swelling  GASTROINTESTINAL: No abdominal or epigastric pain. No nausea, vomiting, or hematemesis; loose stools, rectal pain  GENITOURINARY: No dysuria, frequency, hematuria, or incontinence  NEUROLOGICAL: No headaches, memory loss, loss of strength, numbness, or tremors  SKIN: No itching, burning, rashes, or lesions   LYMPH NODES: No enlarged glands  ENDOCRINE: No heat or cold intolerance; No hair loss  MUSCULOSKELETAL: No joint pain or swelling; No muscle, back, or extremity pain  PSYCHIATRIC: No depression, anxiety, mood swings, or difficulty sleeping  HEME/LYMPH: No easy bruising, or bleeding gums  ALLERGY AND IMMUNOLOGIC: No hives or eczema    Vital Signs Last 24 Hrs  T(C): 36.4 (15 Sep 2022 07:48), Max: 37.1 (14 Sep 2022 23:15)  T(F): 97.6 (15 Sep 2022 07:48), Max: 98.7 (14 Sep 2022 23:15)  HR: 103 (15 Sep 2022 08:40) (68 - 103)  BP: 161/88 (15 Sep 2022 08:40) (161/88 - 183/109)  BP(mean): --  RR: 18 (15 Sep 2022 07:48) (18 - 18)  SpO2: 95% (15 Sep 2022 07:48) (93% - 97%)    Parameters below as of 15 Sep 2022 07:48  Patient On (Oxygen Delivery Method): room air        PHYSICAL EXAM:  GENERAL: NAD, well-groomed, well-developed  HEAD:  Atraumatic, Normocephalic  EYES: EOMI, PERRLA, conjunctiva and sclera clear  ENMT: Moist mucous membranes,   NECK: Supple, No JVD, Normal thyroid  NERVOUS SYSTEM:  Alert & Oriented X3, Good concentration; Motor Strength 5/5 B/L upper and lower extremities; DTRs 2+ intact and symmetric  CHEST/LUNG: Clear to percussion bilaterally; No rales, rhonchi, wheezing, or rubs  HEART: Regular rate and rhythm; No murmurs, rubs, or gallops  ABDOMEN: Soft, Nontender, Nondistended; Bowel sounds present  EXTREMITIES:  2+ Peripheral Pulses, No clubbing, cyanosis, or edema  LYMPH: No lymphadenopathy noted  SKIN: No rashes or lesions    LABS:                        14.5   9.30  )-----------( 348      ( 15 Sep 2022 08:10 )             43.8     09-15    138  |  99  |  9   ----------------------------<  129<H>  3.6   |  32<H>  |  0.63    Ca    8.9      15 Sep 2022 08:10    TPro  7.2  /  Alb  3.9  /  TBili  0.4  /  DBili  x   /  AST  20  /  ALT  28  /  AlkPhos  107  09-15        CAPILLARY BLOOD GLUCOSE              I&O's Summary      RADIOLOGY & ADDITIONAL TESTS:    Imaging Personally Reviewed:  [x ] YES  [ ] NO    Consultant(s) Notes Reviewed:  [x ] YES  [ ] NO    Care Discussed with Consultants/Other Providers [x ] YES  [ ] NO
Patient is a 82y old  Female who presents with a chief complaint of     INTERVAL HPI/OVERNIGHT EVENTS:overnight events noted    Home Medications:  losartan 50 mg oral tablet: 1 tab(s) orally once a day (13 Sep 2022 19:00)  Norvasc 5 mg oral tablet:  (13 Sep 2022 19:00)  OXcarbazepine 300 mg oral tablet:  (13 Sep 2022 19:00)      MEDICATIONS  (STANDING):  enoxaparin Injectable 40 milliGRAM(s) SubCutaneous every 24 hours  losartan 50 milliGRAM(s) Oral daily  senna 2 Tablet(s) Oral at bedtime    MEDICATIONS  (PRN):  acetaminophen     Tablet .. 650 milliGRAM(s) Oral every 6 hours PRN Temp greater or equal to 38C (100.4F), Mild Pain (1 - 3)  bisacodyl Suppository 10 milliGRAM(s) Rectal daily PRN Constipation  polyethylene glycol 3350 17 Gram(s) Oral daily PRN Constipation      Allergies    No Known Allergies    Intolerances        REVIEW OF SYSTEMS:  CONSTITUTIONAL: No fever, weight loss, has fatigue  EYES: No eye pain, visual disturbances, or discharge  ENMT:  No difficulty hearing, tinnitus, vertigo; No sinus or throat pain  NECK: No pain or stiffness  BREASTS: No pain, masses, or nipple discharge  RESPIRATORY: No cough, wheezing, chills or hemoptysis; No shortness of breath  CARDIOVASCULAR: No chest pain, palpitations, dizziness, or leg swelling  GASTROINTESTINAL: No abdominal or epigastric pain. No nausea, vomiting, or hematemesis; constipation.   GENITOURINARY: No dysuria, frequency, hematuria, or incontinence  NEUROLOGICAL: No headaches, memory loss, loss of strength, numbness, or tremors  SKIN: No itching, burning, rashes, or lesions   LYMPH NODES: No enlarged glands  ENDOCRINE: No heat or cold intolerance; No hair loss  MUSCULOSKELETAL: No joint pain or swelling; No muscle, back, or extremity pain  PSYCHIATRIC: No depression, anxiety, mood swings, or difficulty sleeping  HEME/LYMPH: No easy bruising, or bleeding gums  ALLERGY AND IMMUNOLOGIC: No hives or eczema    Vital Signs Last 24 Hrs  T(C): 36.4 (14 Sep 2022 07:44), Max: 36.9 (13 Sep 2022 16:45)  T(F): 97.6 (14 Sep 2022 07:44), Max: 98.5 (13 Sep 2022 16:45)  HR: 67 (14 Sep 2022 07:44) (67 - 78)  BP: 170/87 (14 Sep 2022 07:44) (160/75 - 178/92)  BP(mean): --  RR: 18 (14 Sep 2022 07:44) (17 - 18)  SpO2: 97% (14 Sep 2022 07:44) (94% - 97%)    Parameters below as of 14 Sep 2022 07:44  Patient On (Oxygen Delivery Method): room air        PHYSICAL EXAM:  GENERAL: NAD, well-groomed, well-developed  HEAD:  Atraumatic, Normocephalic  EYES: EOMI, PERRLA, conjunctiva and sclera clear  ENMT: Moist mucous membranes,   NECK: Supple, No JVD, Normal thyroid  NERVOUS SYSTEM:  Alert & Oriented X3,non focal  CHEST/LUNG: Clear to percussion bilaterally; No rales, rhonchi, wheezing, or rubs  HEART: Regular rate and rhythm; No murmurs, rubs, or gallops  ABDOMEN: Soft, Nontender, Nondistended; Bowel sounds present  EXTREMITIES:  2+ Peripheral Pulses, No clubbing, cyanosis, or edema  LYMPH: No lymphadenopathy noted  SKIN: No rashes or lesions    LABS:                        14.1   9.18  )-----------( 303      ( 14 Sep 2022 06:00 )             43.5     09-14    134<L>  |  99  |  12  ----------------------------<  119<H>  3.9   |  26  |  0.58    Ca    8.6      14 Sep 2022 06:00    TPro  7.4  /  Alb  3.7  /  TBili  0.4  /  DBili  x   /  AST  21  /  ALT  27  /  AlkPhos  100  09-14        CAPILLARY BLOOD GLUCOSE              I&O's Summary      RADIOLOGY & ADDITIONAL TESTS:    Imaging Personally Reviewed:  [ x] YES  [ ] NO    Consultant(s) Notes Reviewed:  [x ] YES  [ ] NO    Care Discussed with Consultants/Other Providers [x ] YES  [ ] NO
Patient is a 82y old  Female who presents with a chief complaint of rectal pain with constipation (16 Sep 2022 11:57)      INTERVAL HPI/OVERNIGHT EVENTS:overnight events noted    Home Medications:  losartan 50 mg oral tablet: 1 tab(s) orally once a day (13 Sep 2022 19:00)  Norvasc 5 mg oral tablet:  (13 Sep 2022 19:00)  OXcarbazepine 300 mg oral tablet:  (13 Sep 2022 19:00)      MEDICATIONS  (STANDING):  amLODIPine   Tablet 10 milliGRAM(s) Oral daily  enoxaparin Injectable 40 milliGRAM(s) SubCutaneous every 24 hours  hemorrhoidal Ointment 1 Application(s) Rectal two times a day  losartan 100 milliGRAM(s) Oral daily  OXcarbazepine 300 milliGRAM(s) Oral <User Schedule>  senna 2 Tablet(s) Oral at bedtime    MEDICATIONS  (PRN):  acetaminophen     Tablet .. 650 milliGRAM(s) Oral every 6 hours PRN Temp greater or equal to 38C (100.4F), Mild Pain (1 - 3)  ALBUTerol   0.042% 1.25 milliGRAM(s) Nebulizer every 8 hours PRN Bronchospasm  bisacodyl Suppository 10 milliGRAM(s) Rectal daily PRN Constipation  hydrALAZINE Injectable 10 milliGRAM(s) IV Push every 8 hours PRN bp >180  ketorolac   Injectable 15 milliGRAM(s) IV Push every 8 hours PRN Moderate Pain (4 - 6)  polyethylene glycol 3350 17 Gram(s) Oral daily PRN Constipation      Allergies    No Known Allergies    Intolerances        REVIEW OF SYSTEMS:  CONSTITUTIONAL: No fever, weight loss, or fatigue  EYES: No eye pain, visual disturbances, or discharge  ENMT:  No difficulty hearing, tinnitus, vertigo; No sinus or throat pain  NECK: No pain or stiffness  BREASTS: No pain, masses, or nipple discharge  RESPIRATORY: No cough, wheezing, chills or hemoptysis; No shortness of breath  CARDIOVASCULAR: No chest pain, palpitations, dizziness, or leg swelling  GASTROINTESTINAL: No abdominal or epigastric pain. No nausea, vomiting, or hematemesis; has constipation.   GENITOURINARY: No dysuria, frequency, hematuria, or incontinence  NEUROLOGICAL: No headaches, memory loss, loss of strength, numbness, or tremors  SKIN: No itching, burning, rashes, or lesions   LYMPH NODES: No enlarged glands  ENDOCRINE: No heat or cold intolerance; No hair loss  MUSCULOSKELETAL: No joint pain or swelling; No muscle, back, or extremity pain  PSYCHIATRIC: No depression, anxiety, mood swings, or difficulty sleeping  HEME/LYMPH: No easy bruising, or bleeding gums  ALLERGY AND IMMUNOLOGIC: No hives or eczema    Vital Signs Last 24 Hrs  T(C): 36.4 (16 Sep 2022 08:34), Max: 36.5 (15 Sep 2022 23:10)  T(F): 97.6 (16 Sep 2022 08:34), Max: 97.7 (15 Sep 2022 23:10)  HR: 69 (16 Sep 2022 09:21) (69 - 86)  BP: 171/82 (16 Sep 2022 09:21) (158/78 - 205/106)  BP(mean): --  RR: 18 (16 Sep 2022 09:21) (18 - 18)  SpO2: 96% (16 Sep 2022 09:21) (96% - 96%)    Parameters below as of 16 Sep 2022 09:21  Patient On (Oxygen Delivery Method): room air        PHYSICAL EXAM:  GENERAL: NAD, well-groomed, well-developed  HEAD:  Atraumatic, Normocephalic  EYES: EOMI, PERRLA, conjunctiva and sclera clear  ENMT: Moist mucous membranes,   NECK: Supple, No JVD, Normal thyroid  NERVOUS SYSTEM:  Alert & Oriented X3, non focal  CHEST/LUNG: Clear to percussion bilaterally; No rales, rhonchi, wheezing, or rubs  HEART: Regular rate and rhythm; No murmurs, rubs, or gallops  ABDOMEN: Soft, Nontender, Nondistended; Bowel sounds present  EXTREMITIES:  2+ Peripheral Pulses, No clubbing, cyanosis, or edema  LYMPH: No lymphadenopathy noted  SKIN: No rashes or lesions    LABS:                        14.6   8.31  )-----------( 339      ( 16 Sep 2022 06:30 )             43.8     09-16    133<L>  |  97  |  10  ----------------------------<  125<H>  3.7   |  24  |  0.56    Ca    9.0      16 Sep 2022 06:30    TPro  7.3  /  Alb  3.5  /  TBili  0.3  /  DBili  x   /  AST  22  /  ALT  22  /  AlkPhos  93  09-16        CAPILLARY BLOOD GLUCOSE              I&O's Summary      RADIOLOGY & ADDITIONAL TESTS:    Imaging Personally Reviewed:  [x ] YES  [ ] NO    Consultant(s) Notes Reviewed:  [x ] YES  [ ] NO    Care Discussed with Consultants/Other Providers [ x] YES  [ ] NO

## 2022-09-17 NOTE — PROGRESS NOTE ADULT - PROBLEM SELECTOR PLAN 1
manual disimpaction done, surg and GI f up noted, continue current tt regimen for constipation

## 2022-09-17 NOTE — DISCHARGE NOTE NURSING/CASE MANAGEMENT/SOCIAL WORK - NSDCPEFALRISK_GEN_ALL_CORE
For information on Fall & Injury Prevention, visit: [email or web address] OR  [email or web address] OR  https://www.cdc.gov/steadi/patient.html

## 2022-09-17 NOTE — DISCHARGE NOTE PROVIDER - NSDCMRMEDTOKEN_GEN_ALL_CORE_FT
bisacodyl 10 mg rectal suppository: 1 suppository(ies) rectal once a day, As needed, Constipation  losartan 100 mg oral tablet: 1 tab(s) orally once a day  Norvasc 5 mg oral tablet:   OXcarbazepine 300 mg oral tablet:   polyethylene glycol 3350 oral powder for reconstitution: 17 gram(s) orally once a day, As needed, Constipation  prepa: 1 application rectal 4 times a day   senna leaf extract oral tablet: 2 tab(s) orally once a day (at bedtime)

## 2022-09-17 NOTE — DISCHARGE NOTE PROVIDER - NSDCCPCAREPLAN_GEN_ALL_CORE_FT
PRINCIPAL DISCHARGE DIAGNOSIS  Diagnosis: Fecal impaction  Assessment and Plan of Treatment: cont constipation regimen and f up with gi      SECONDARY DISCHARGE DIAGNOSES  Diagnosis: Rectal pain  Assessment and Plan of Treatment:

## 2022-09-17 NOTE — PROGRESS NOTE ADULT - PROVIDER SPECIALTY LIST ADULT
Gastroenterology
Surgery
Cardiology
Gastroenterology
Surgery
Internal Medicine

## 2022-09-17 NOTE — PROGRESS NOTE ADULT - ASSESSMENT
The patient is an 82 year old female with a history of HTN who presents with rectal pain.    9/17/22  Seen at [facility]  Sitting up  No complaints    Plan:  - Elevated BPs likely multifactorial from pain, anxiety, and not receiving usual dose of home medications and now better controlled  - Continue losartan 100 mg daily  - Continue amlodipine 10 mg daily  - Pain control  - Discharge planning
Rectal Pain/Constipation  needs Colorectal eval but doesnt come here  continue bowel regimen  topical creams
Rectal Pain/Constipation  needs Colorectal eval but doesnt come here  continue bowel regimen  topical creams
82-year-old female with history of hypertension presents with diarrhea and rectal pain.  Patient reports started having diarrhea 6 days ago.  Ate Burger Moody the day before and unsure if related.  Started to have mild rectal pain that day, rectal pain has progressively getting worse.  States diarrhea very loose in nature.  No noticed blood in her diarrhea.  No fevers.  Was seen at urgent care 2 days ago and told rectal pain likely due to hemorrhoid.  Was given rectal steroid cream.  Was seen by GI yesterday and was given an anesthetic cream and another steroid cream.  Was recommended to follow-up with colorectal surgery.  Unable to make appointment for 2 months.  Was seen at this emergency room last night for her rectal pain.  Patient was discussed with Dr. Hobbs by ED attending. told at that time likely nothing else to do.  Recommended to follow-up with colorectal surgery.  Patient reports having a hard time sleeping last night due to the rectal pain and discomfort.  Slight abdominal discomfort the past 2 days.  States mild crampy in nature.  Slight nausea since yesterday, no vomiting.  No recent antibiotic use.  No foreign travels.  No known sick exposures.  Previous abdominal surgery include cholecystectomy.PCP Carmen Mckeon  had CT abd in ED < from: CT Abdomen and Pelvis w/ IV Cont (09.13.22 @ 12:08) >  Constipated colon with stercoral proctocolitis  1.5 x 1.2 cm pancreatic head cystic lesion. Recommend MRI   Small sliding hiatus hernia with thickened distal esophagus. Recommend   endoscopy.  received enema and disimpaction tried by ED physician and GI consult called  admitted for   rectal pain with fecal impaction with proctocolitis with overflow diarrhea  HTN uncontrolled, losartan increased  continue with bowel regimen , topical creams,  hiatal hernia  pancreatic cyst  out pr f up with GI and colorectal surg
Proctocolitis secondary to fecal impaction.  Symptoms resolving.  Continue with bowel regimen.  Increase fluid and fiber daily.  Supplement as needed.  F/u with Colorectal surgery.  F/u with GI.  No acute surgical interventions indicated.  No contraindication to discharge from surgical perspective.  Discussed with Dr Baires.
Rectal Pain/Constipation  needs Colorectal eval but doesnt come here  continue bowel regimen  topical creams  outpt colonoscopy
Rectal Pain/Constipation  needs Colorectal eval but doesnt come here  continue bowel regimen  topical creams  outpt colonoscopy
82-year-old female with history of hypertension presents with diarrhea and rectal pain.  Patient reports started having diarrhea 6 days ago.  Ate Burger Moody the day before and unsure if related.  Started to have mild rectal pain that day, rectal pain has progressively getting worse.  States diarrhea very loose in nature.  No noticed blood in her diarrhea.  No fevers.  Was seen at urgent care 2 days ago and told rectal pain likely due to hemorrhoid.  Was given rectal steroid cream.  Was seen by GI yesterday and was given an anesthetic cream and another steroid cream.  Was recommended to follow-up with colorectal surgery.  Unable to make appointment for 2 months.  Was seen at this emergency room last night for her rectal pain.  Patient was discussed with Dr. Hobbs by ED attending. told at that time likely nothing else to do.  Recommended to follow-up with colorectal surgery.  Patient reports having a hard time sleeping last night due to the rectal pain and discomfort.  Slight abdominal discomfort the past 2 days.  States mild crampy in nature.  Slight nausea since yesterday, no vomiting.  No recent antibiotic use.  No foreign travels.  No known sick exposures.  Previous abdominal surgery include cholecystectomy.PCP Carmen Mckeon  had CT abd in ED < from: CT Abdomen and Pelvis w/ IV Cont (09.13.22 @ 12:08) >  Constipated colon with stercoral proctocolitis  1.5 x 1.2 cm pancreatic head cystic lesion. Recommend MRI   Small sliding hiatus hernia with thickened distal esophagus. Recommend   endoscopy.  received enema and disimpaction tried by ED physician and GI consult called  admitted for   fecal impaction with proctocolitis with overflow incontinence  HTN uncontrolled, hoe meds adjusted, cardio consult  continue with bowel regimen , topical creams,  received golytely  surg and GI f up noted  
82-year-old female with history of hypertension presents with diarrhea and rectal pain.  Patient reports started having diarrhea 6 days ago.  Ate Burger Moody the day before and unsure if related.  Started to have mild rectal pain that day, rectal pain has progressively getting worse.  States diarrhea very loose in nature.  No noticed blood in her diarrhea.  No fevers.  Was seen at urgent care 2 days ago and told rectal pain likely due to hemorrhoid.  Was given rectal steroid cream.  Was seen by GI yesterday and was given an anesthetic cream and another steroid cream.  Was recommended to follow-up with colorectal surgery.  Unable to make appointment for 2 months.  Was seen at this emergency room last night for her rectal pain.  Patient was discussed with Dr. Hobbs by ED attending. told at that time likely nothing else to do.  Recommended to follow-up with colorectal surgery.  Patient reports having a hard time sleeping last night due to the rectal pain and discomfort.  Slight abdominal discomfort the past 2 days.  States mild crampy in nature.  Slight nausea since yesterday, no vomiting.  No recent antibiotic use.  No foreign travels.  No known sick exposures.  Previous abdominal surgery include cholecystectomy.PCP Carmen Mckeon  had CT abd in ED < from: CT Abdomen and Pelvis w/ IV Cont (09.13.22 @ 12:08) >  Constipated colon with stercoral proctocolitis  1.5 x 1.2 cm pancreatic head cystic lesion. Recommend MRI   Small sliding hiatus hernia with thickened distal esophagus. Recommend   endoscopy.  received enema and disimpaction tried by ED physician and GI consult called  admitted for   fecal impaction with proctocolitis with overflow incontinence  HTN  continue with bowel regimen , topical creams,  received golytely  GI f up.   if stable DC plan  
82-year-old female with history of hypertension presents with diarrhea and rectal pain.  Patient reports started having diarrhea 6 days ago.  Ate Burger Moody the day before and unsure if related.  Started to have mild rectal pain that day, rectal pain has progressively getting worse.  States diarrhea very loose in nature.  No noticed blood in her diarrhea.  No fevers.  Was seen at urgent care 2 days ago and told rectal pain likely due to hemorrhoid.  Was given rectal steroid cream.  Was seen by GI yesterday and was given an anesthetic cream and another steroid cream.  Was recommended to follow-up with colorectal surgery.  Unable to make appointment for 2 months.  Was seen at this emergency room last night for her rectal pain.  Patient was discussed with Dr. Hobbs by ED attending. told at that time likely nothing else to do.  Recommended to follow-up with colorectal surgery.  Patient reports having a hard time sleeping last night due to the rectal pain and discomfort.  Slight abdominal discomfort the past 2 days.  States mild crampy in nature.  Slight nausea since yesterday, no vomiting.  No recent antibiotic use.  No foreign travels.  No known sick exposures.  Previous abdominal surgery include cholecystectomy.PCP Carmen Mckeon  had CT abd in ED < from: CT Abdomen and Pelvis w/ IV Cont (09.13.22 @ 12:08) >  Constipated colon with stercoral proctocolitis  1.5 x 1.2 cm pancreatic head cystic lesion. Recommend MRI   Small sliding hiatus hernia with thickened distal esophagus. Recommend   endoscopy.  received enema and disimpaction tried by ED physician and GI consult called  admitted for   fecal impaction with proctocolitis with overflow incontinence  HTN      surg and GI consult noted. continue regimen ,

## 2022-09-17 NOTE — PROGRESS NOTE ADULT - REASON FOR ADMISSION
rectal pain with constipation
constipation
rectal pain with constipation

## 2022-09-17 NOTE — DISCHARGE NOTE NURSING/CASE MANAGEMENT/SOCIAL WORK - PATIENT PORTAL LINK FT
You can access the FollowMyHealth Patient Portal offered by [facility] by registering at the following website: [email or web address]. By joining [facility]’s FollowMyHealth portal, you will also be able to view your health information using other applications (apps) compatible with our system.

## 2022-12-26 ENCOUNTER — EMERGENCY (EMERGENCY)
Facility: HOSPITAL | Age: 82
LOS: 1 days | Discharge: HOME | End: 2022-12-26
Attending: EMERGENCY MEDICINE | Admitting: EMERGENCY MEDICINE
Payer: MEDICARE

## 2022-12-26 VITALS
OXYGEN SATURATION: 97 % | HEART RATE: 70 BPM | RESPIRATION RATE: 19 BRPM | SYSTOLIC BLOOD PRESSURE: 178 MMHG | DIASTOLIC BLOOD PRESSURE: 89 MMHG

## 2022-12-26 VITALS
WEIGHT: 190.04 LBS | SYSTOLIC BLOOD PRESSURE: 170 MMHG | HEIGHT: 63 IN | HEART RATE: 72 BPM | TEMPERATURE: 98 F | RESPIRATION RATE: 16 BRPM | OXYGEN SATURATION: 97 % | DIASTOLIC BLOOD PRESSURE: 100 MMHG

## 2022-12-26 DIAGNOSIS — Z98.49 CATARACT EXTRACTION STATUS, UNSPECIFIED EYE: Chronic | ICD-10-CM

## 2022-12-26 LAB
ALBUMIN SERPL ELPH-MCNC: 3.6 G/DL — SIGNIFICANT CHANGE UP (ref 3.3–5)
ALP SERPL-CCNC: 112 U/L — SIGNIFICANT CHANGE UP (ref 30–120)
ALT FLD-CCNC: 22 U/L DA — SIGNIFICANT CHANGE UP (ref 10–60)
ANION GAP SERPL CALC-SCNC: 8 MMOL/L — SIGNIFICANT CHANGE UP (ref 5–17)
APPEARANCE UR: CLEAR — SIGNIFICANT CHANGE UP
AST SERPL-CCNC: 25 U/L — SIGNIFICANT CHANGE UP (ref 10–40)
BASOPHILS # BLD AUTO: 0.03 K/UL — SIGNIFICANT CHANGE UP (ref 0–0.2)
BASOPHILS NFR BLD AUTO: 0.4 % — SIGNIFICANT CHANGE UP (ref 0–2)
BILIRUB SERPL-MCNC: 0.3 MG/DL — SIGNIFICANT CHANGE UP (ref 0.2–1.2)
BILIRUB UR-MCNC: NEGATIVE — SIGNIFICANT CHANGE UP
BUN SERPL-MCNC: 10 MG/DL — SIGNIFICANT CHANGE UP (ref 7–23)
CALCIUM SERPL-MCNC: 8.9 MG/DL — SIGNIFICANT CHANGE UP (ref 8.4–10.5)
CHLORIDE SERPL-SCNC: 97 MMOL/L — SIGNIFICANT CHANGE UP (ref 96–108)
CO2 SERPL-SCNC: 27 MMOL/L — SIGNIFICANT CHANGE UP (ref 22–31)
COLOR SPEC: YELLOW — SIGNIFICANT CHANGE UP
CREAT SERPL-MCNC: 0.61 MG/DL — SIGNIFICANT CHANGE UP (ref 0.5–1.3)
DIFF PNL FLD: ABNORMAL
EGFR: 90 ML/MIN/1.73M2 — SIGNIFICANT CHANGE UP
EOSINOPHIL # BLD AUTO: 0.03 K/UL — SIGNIFICANT CHANGE UP (ref 0–0.5)
EOSINOPHIL NFR BLD AUTO: 0.4 % — SIGNIFICANT CHANGE UP (ref 0–6)
FLUAV AG NPH QL: SIGNIFICANT CHANGE UP
FLUBV AG NPH QL: SIGNIFICANT CHANGE UP
GLUCOSE SERPL-MCNC: 107 MG/DL — HIGH (ref 70–99)
GLUCOSE UR QL: NEGATIVE MG/DL — SIGNIFICANT CHANGE UP
HCT VFR BLD CALC: 41 % — SIGNIFICANT CHANGE UP (ref 34.5–45)
HGB BLD-MCNC: 13.8 G/DL — SIGNIFICANT CHANGE UP (ref 11.5–15.5)
IMM GRANULOCYTES NFR BLD AUTO: 0.4 % — SIGNIFICANT CHANGE UP (ref 0–0.9)
KETONES UR-MCNC: NEGATIVE — SIGNIFICANT CHANGE UP
LEUKOCYTE ESTERASE UR-ACNC: ABNORMAL
LIDOCAIN IGE QN: 59 U/L — LOW (ref 73–393)
LYMPHOCYTES # BLD AUTO: 1.86 K/UL — SIGNIFICANT CHANGE UP (ref 1–3.3)
LYMPHOCYTES # BLD AUTO: 26.1 % — SIGNIFICANT CHANGE UP (ref 13–44)
MCHC RBC-ENTMCNC: 28.2 PG — SIGNIFICANT CHANGE UP (ref 27–34)
MCHC RBC-ENTMCNC: 33.7 GM/DL — SIGNIFICANT CHANGE UP (ref 32–36)
MCV RBC AUTO: 83.8 FL — SIGNIFICANT CHANGE UP (ref 80–100)
MONOCYTES # BLD AUTO: 0.56 K/UL — SIGNIFICANT CHANGE UP (ref 0–0.9)
MONOCYTES NFR BLD AUTO: 7.8 % — SIGNIFICANT CHANGE UP (ref 2–14)
NEUTROPHILS # BLD AUTO: 4.63 K/UL — SIGNIFICANT CHANGE UP (ref 1.8–7.4)
NEUTROPHILS NFR BLD AUTO: 64.9 % — SIGNIFICANT CHANGE UP (ref 43–77)
NITRITE UR-MCNC: NEGATIVE — SIGNIFICANT CHANGE UP
NRBC # BLD: 0 /100 WBCS — SIGNIFICANT CHANGE UP (ref 0–0)
PH UR: 6.5 — SIGNIFICANT CHANGE UP (ref 5–8)
PLATELET # BLD AUTO: 282 K/UL — SIGNIFICANT CHANGE UP (ref 150–400)
POTASSIUM SERPL-MCNC: 4.3 MMOL/L — SIGNIFICANT CHANGE UP (ref 3.5–5.3)
POTASSIUM SERPL-SCNC: 4.3 MMOL/L — SIGNIFICANT CHANGE UP (ref 3.5–5.3)
PROT SERPL-MCNC: 7.3 G/DL — SIGNIFICANT CHANGE UP (ref 6–8.3)
PROT UR-MCNC: 15 MG/DL
RBC # BLD: 4.89 M/UL — SIGNIFICANT CHANGE UP (ref 3.8–5.2)
RBC # FLD: 14.2 % — SIGNIFICANT CHANGE UP (ref 10.3–14.5)
RSV RNA NPH QL NAA+NON-PROBE: SIGNIFICANT CHANGE UP
SARS-COV-2 RNA SPEC QL NAA+PROBE: SIGNIFICANT CHANGE UP
SODIUM SERPL-SCNC: 132 MMOL/L — LOW (ref 135–145)
SP GR SPEC: 1.01 — SIGNIFICANT CHANGE UP (ref 1.01–1.02)
UROBILINOGEN FLD QL: 1 MG/DL
WBC # BLD: 7.14 K/UL — SIGNIFICANT CHANGE UP (ref 3.8–10.5)
WBC # FLD AUTO: 7.14 K/UL — SIGNIFICANT CHANGE UP (ref 3.8–10.5)

## 2022-12-26 PROCEDURE — G1004: CPT

## 2022-12-26 PROCEDURE — 80053 COMPREHEN METABOLIC PANEL: CPT

## 2022-12-26 PROCEDURE — 74177 CT ABD & PELVIS W/CONTRAST: CPT | Mod: 26,ME

## 2022-12-26 PROCEDURE — 74177 CT ABD & PELVIS W/CONTRAST: CPT | Mod: ME

## 2022-12-26 PROCEDURE — 99284 EMERGENCY DEPT VISIT MOD MDM: CPT

## 2022-12-26 PROCEDURE — 87637 SARSCOV2&INF A&B&RSV AMP PRB: CPT

## 2022-12-26 PROCEDURE — 81001 URINALYSIS AUTO W/SCOPE: CPT

## 2022-12-26 PROCEDURE — 83690 ASSAY OF LIPASE: CPT

## 2022-12-26 PROCEDURE — 99284 EMERGENCY DEPT VISIT MOD MDM: CPT | Mod: 25

## 2022-12-26 PROCEDURE — 36415 COLL VENOUS BLD VENIPUNCTURE: CPT

## 2022-12-26 PROCEDURE — 85025 COMPLETE CBC W/AUTO DIFF WBC: CPT

## 2022-12-26 PROCEDURE — 36000 PLACE NEEDLE IN VEIN: CPT | Mod: 59

## 2022-12-26 RX ORDER — MULTIVIT WITH MIN/MFOLATE/K2 340-15/3 G
1 POWDER (GRAM) ORAL ONCE
Refills: 0 | Status: COMPLETED | OUTPATIENT
Start: 2022-12-26 | End: 2022-12-26

## 2022-12-26 RX ORDER — AMLODIPINE BESYLATE 2.5 MG/1
0 TABLET ORAL
Qty: 0 | Refills: 0 | DISCHARGE

## 2022-12-26 RX ORDER — SODIUM CHLORIDE 9 MG/ML
1000 INJECTION INTRAMUSCULAR; INTRAVENOUS; SUBCUTANEOUS ONCE
Refills: 0 | Status: COMPLETED | OUTPATIENT
Start: 2022-12-26 | End: 2022-12-26

## 2022-12-26 RX ORDER — OXCARBAZEPINE 300 MG/1
0 TABLET, FILM COATED ORAL
Qty: 0 | Refills: 0 | DISCHARGE

## 2022-12-26 RX ADMIN — Medication 1 BOTTLE: at 15:13

## 2022-12-26 RX ADMIN — Medication 1 ENEMA: at 15:57

## 2022-12-26 RX ADMIN — SODIUM CHLORIDE 1000 MILLILITER(S): 9 INJECTION INTRAMUSCULAR; INTRAVENOUS; SUBCUTANEOUS at 13:23

## 2022-12-26 NOTE — ED PROVIDER NOTE - OBJECTIVE STATEMENT
82-year-old female with history of hypertension, facial neuralgia presents with abdominal discomfort over past 6 days, associated with some constipation.  No acute nausea or vomiting.  No diarrhea.  No recent trauma.  No fevers or chills.  No dysuria/hematuria.  No numbness/tingling/focal weakness.  No acute neck or back pain.  No aggravating or alleviating factors otherwise noted.  No other acute complaints.  Patient states the symptoms feel similar to pain that she had back in September, when she was treated for constipation.  Patient not previously vaccinated for COVID, no known COVID infections in the past.

## 2022-12-26 NOTE — ED ADULT NURSE NOTE - VOIDING
What Type Of Note Output Would You Prefer (Optional)?: Standard Output How Severe Is Your Skin Lesion?: mild Has Your Skin Lesion Been Treated?: not been treated Is This A New Presentation, Or A Follow-Up?: Skin Lesion without difficulty

## 2022-12-26 NOTE — ED ADULT NURSE NOTE - OBJECTIVE STATEMENT
Pt came in for dull generalized abdominal pain associated with constipation x 6 days now. Pt denies any vomiting, nausea, diarrhea , fever or chills.

## 2022-12-26 NOTE — ED ADULT TRIAGE NOTE - NSWEIGHTCALCTOOLDRUG_GEN_A_CORE
Prior Authorization received from [id number] at [facility] [phone] for surgery with Dr. Amari Marie on 7/26/2018, stays for 7/26/2018 through 7/28/2018. Fax number [phone] for UR should the stay be longer. Reference number [id number].  used

## 2022-12-26 NOTE — ED ADULT NURSE NOTE - IN THE PAST 12 MONTHS HAVE YOU USED DRUGS OTHER THAN THOSE REQUIRED FOR MEDICAL REASON?
Dr Gomez's Office Hours:  Monday: 10:30 - 6:00  Tuesday: 7:00 - 2:30   Wednesday 7:00 - 2:30  Thursday: 7:30 - 3:00  Friday 7:30 - 3:00  Saturday(every 4th): 7:00-11:00    To review your office visit summary and see your test results, please sign up for the [email or web address].     No Good

## 2022-12-26 NOTE — ED PROVIDER NOTE - CLINICAL SUMMARY MEDICAL DECISION MAKING FREE TEXT BOX
Acute abdominal discomfort over past 6 days with some constipation.  Some abdominal tenderness, will check labs, CT, UA, IV fluids, reeval.

## 2022-12-26 NOTE — ED PROVIDER NOTE - DIFFERENTIAL DIAGNOSIS
Abdominal pain with constipation, rule out acute abdominal pathology/appendicitis/colitis, acute constipation, lab abnormalities Differential Diagnosis

## 2022-12-26 NOTE — ED PROVIDER NOTE - PROGRESS NOTE DETAILS
Patient doing well, no acute complaints.  Discussed with patient regarding CT findings, lab findings.  Will give patient meds now, she will follow-up with her primary care doctor as an outpatient, as well as her gastroenterologist soon as possible. Patient wishes to do her Fleet enema at home, will follow-up with her doctor soon as possible.  Discussed with patient regarding abdominal pain precaution instructions, constipation precautions and instructions, and importance of close prompt follow-up with her primary care doctor as well as GI.

## 2022-12-26 NOTE — ED PROVIDER NOTE - CARE PROVIDER_API CALL
Carmen Gomez   INTERNAL MEDICINE  [address]  [place], [place] [id number]  Phone: [phone]  Fax: [phone]  Follow Up Time:     REBECCA HERNANDEZ  Gastroenterology  [address] [place] [address], [facility] [address]  [place] [address]  Phone: [phone]  Fax: [phone]  Follow Up Time:

## 2022-12-26 NOTE — ED PROVIDER NOTE - NSFOLLOWUPINSTRUCTIONS_ED_ALL_ED_FT
1. Follow-up with your Primary Medical Doctor or referred doctor. Call today / next business day for prompt follow-up.  2. Return to Emergency room for any worsening or persistent pain, weakness, fever, vomiting, diarrhea, unable to eat / drink, weak or dizzy, or any other concerning symptoms.  3. See attached instruction sheets for additional information, including information regarding signs and symptoms to look out for, reasons to seek immediate care and other important instructions.  4.  Plenty of fluids  5.  Follow-up with your gastroenterologist soon as possible, call tomorrow for prompt follow-up.

## 2022-12-26 NOTE — ED PROVIDER NOTE - PATIENT PORTAL LINK FT
You can access the FollowMyHealth Patient Portal offered by [facility] by registering at the following website: [email or web address]. By joining [facility]’s FollowMyHealth portal, you will also be able to view your health information using other applications (apps) compatible with our system.

## 2023-12-04 NOTE — ED ADULT TRIAGE NOTE - MEANS OF ARRIVAL
From: Luisa Just  To: Wilburn Castleman  Sent: 12/1/2023 1:49 PM CST  Subject: Revisit needed to refill mounjaro? Hi. Was wondering if Dr. Curt Marinelli needed for me to come back in to re-examine after 4 weeks in mounjaro? Need refill. So far no signs of GI issues. Tolerating it well.  Thanks Kwadwo Cantu ambulatory

## 2024-03-19 NOTE — ED ADULT NURSE NOTE - ED CARDIAC CAPILLARY REFILL
2 seconds or less 35yoF PMHx DVT (diagnosed 2021 while pregnant, no longer on anticoagulation), recent COVID and flu infections, cholecystectomy presenting for cough, congestion, fever for the past 2 days. Pt was seen by her PMD for 35yoF PMHx DVT (diagnosed 2021 while pregnant, no longer on anticoagulation), recent COVID and flu infections, cholecystectomy presenting for cough, congestion, fever (tmax 101) for the past 2 days. Pt was seen by her PMD last week, scheduled for outpt workup for possible granulomatous disease, was supposed to go for follow up appointment today, but her PMD's office would not see her because of her fever. Last dose of tylenol about 1 hour ago. Denies any vomiting, diarrhea,

## 2024-07-12 ENCOUNTER — EMERGENCY (EMERGENCY)
Facility: HOSPITAL | Age: 84
LOS: 1 days | Discharge: HOME | End: 2024-07-12
Attending: EMERGENCY MEDICINE | Admitting: EMERGENCY MEDICINE
Payer: MEDICARE

## 2024-07-12 VITALS
SYSTOLIC BLOOD PRESSURE: 167 MMHG | WEIGHT: 196.21 LBS | HEIGHT: 63 IN | RESPIRATION RATE: 18 BRPM | TEMPERATURE: 98 F | DIASTOLIC BLOOD PRESSURE: 112 MMHG | OXYGEN SATURATION: 98 % | HEART RATE: 80 BPM

## 2024-07-12 VITALS
TEMPERATURE: 98 F | HEART RATE: 64 BPM | OXYGEN SATURATION: 99 % | DIASTOLIC BLOOD PRESSURE: 92 MMHG | SYSTOLIC BLOOD PRESSURE: 195 MMHG | RESPIRATION RATE: 18 BRPM

## 2024-07-12 DIAGNOSIS — Z98.49 CATARACT EXTRACTION STATUS, UNSPECIFIED EYE: Chronic | ICD-10-CM

## 2024-07-12 LAB
ALBUMIN SERPL ELPH-MCNC: 3.3 G/DL — SIGNIFICANT CHANGE UP (ref 3.3–5)
ALP SERPL-CCNC: 98 U/L — SIGNIFICANT CHANGE UP (ref 30–120)
ALT FLD-CCNC: 19 U/L — SIGNIFICANT CHANGE UP (ref 10–60)
ANION GAP SERPL CALC-SCNC: 8 MMOL/L — SIGNIFICANT CHANGE UP (ref 5–17)
APPEARANCE UR: CLEAR — SIGNIFICANT CHANGE UP
APTT BLD: 28.8 SEC — SIGNIFICANT CHANGE UP (ref 24.5–35.6)
AST SERPL-CCNC: 24 U/L — SIGNIFICANT CHANGE UP (ref 10–40)
BASOPHILS # BLD AUTO: 0.03 K/UL — SIGNIFICANT CHANGE UP (ref 0–0.2)
BASOPHILS NFR BLD AUTO: 0.5 % — SIGNIFICANT CHANGE UP (ref 0–2)
BILIRUB SERPL-MCNC: 0.3 MG/DL — SIGNIFICANT CHANGE UP (ref 0.2–1.2)
BILIRUB UR-MCNC: NEGATIVE — SIGNIFICANT CHANGE UP
BUN SERPL-MCNC: 12 MG/DL — SIGNIFICANT CHANGE UP (ref 7–23)
CALCIUM SERPL-MCNC: 8.5 MG/DL — SIGNIFICANT CHANGE UP (ref 8.4–10.5)
CHLORIDE SERPL-SCNC: 97 MMOL/L — SIGNIFICANT CHANGE UP (ref 96–108)
CO2 SERPL-SCNC: 26 MMOL/L — SIGNIFICANT CHANGE UP (ref 22–31)
COLOR SPEC: YELLOW — SIGNIFICANT CHANGE UP
CREAT SERPL-MCNC: 0.72 MG/DL — SIGNIFICANT CHANGE UP (ref 0.5–1.3)
DIFF PNL FLD: NEGATIVE — SIGNIFICANT CHANGE UP
EGFR: 83 ML/MIN/1.73M2 — SIGNIFICANT CHANGE UP
EOSINOPHIL # BLD AUTO: 0.02 K/UL — SIGNIFICANT CHANGE UP (ref 0–0.5)
EOSINOPHIL NFR BLD AUTO: 0.3 % — SIGNIFICANT CHANGE UP (ref 0–6)
GLUCOSE SERPL-MCNC: 120 MG/DL — HIGH (ref 70–99)
GLUCOSE UR QL: NEGATIVE MG/DL — SIGNIFICANT CHANGE UP
HCT VFR BLD CALC: 40.6 % — SIGNIFICANT CHANGE UP (ref 34.5–45)
HGB BLD-MCNC: 13.8 G/DL — SIGNIFICANT CHANGE UP (ref 11.5–15.5)
IMM GRANULOCYTES NFR BLD AUTO: 0.3 % — SIGNIFICANT CHANGE UP (ref 0–0.9)
INR BLD: 1.13 RATIO — SIGNIFICANT CHANGE UP (ref 0.85–1.18)
KETONES UR-MCNC: NEGATIVE MG/DL — SIGNIFICANT CHANGE UP
LACTATE SERPL-SCNC: 0.8 MMOL/L — SIGNIFICANT CHANGE UP (ref 0.7–2)
LEUKOCYTE ESTERASE UR-ACNC: NEGATIVE — SIGNIFICANT CHANGE UP
LIDOCAIN IGE QN: 67 U/L — SIGNIFICANT CHANGE UP (ref 16–77)
LYMPHOCYTES # BLD AUTO: 1.35 K/UL — SIGNIFICANT CHANGE UP (ref 1–3.3)
LYMPHOCYTES # BLD AUTO: 21 % — SIGNIFICANT CHANGE UP (ref 13–44)
MAGNESIUM SERPL-MCNC: 2.3 MG/DL — SIGNIFICANT CHANGE UP (ref 1.6–2.6)
MCHC RBC-ENTMCNC: 28.3 PG — SIGNIFICANT CHANGE UP (ref 27–34)
MCHC RBC-ENTMCNC: 34 GM/DL — SIGNIFICANT CHANGE UP (ref 32–36)
MCV RBC AUTO: 83.4 FL — SIGNIFICANT CHANGE UP (ref 80–100)
MONOCYTES # BLD AUTO: 0.6 K/UL — SIGNIFICANT CHANGE UP (ref 0–0.9)
MONOCYTES NFR BLD AUTO: 9.3 % — SIGNIFICANT CHANGE UP (ref 2–14)
NEUTROPHILS # BLD AUTO: 4.41 K/UL — SIGNIFICANT CHANGE UP (ref 1.8–7.4)
NEUTROPHILS NFR BLD AUTO: 68.6 % — SIGNIFICANT CHANGE UP (ref 43–77)
NITRITE UR-MCNC: NEGATIVE — SIGNIFICANT CHANGE UP
NRBC # BLD: 0 /100 WBCS — SIGNIFICANT CHANGE UP (ref 0–0)
PH UR: 6 — SIGNIFICANT CHANGE UP (ref 5–8)
PLATELET # BLD AUTO: 275 K/UL — SIGNIFICANT CHANGE UP (ref 150–400)
POTASSIUM SERPL-MCNC: 4.5 MMOL/L — SIGNIFICANT CHANGE UP (ref 3.5–5.3)
POTASSIUM SERPL-SCNC: 4.5 MMOL/L — SIGNIFICANT CHANGE UP (ref 3.5–5.3)
PROT SERPL-MCNC: 7 G/DL — SIGNIFICANT CHANGE UP (ref 6–8.3)
PROT UR-MCNC: SIGNIFICANT CHANGE UP MG/DL
PROTHROM AB SERPL-ACNC: 12.3 SEC — SIGNIFICANT CHANGE UP (ref 9.5–13)
RBC # BLD: 4.87 M/UL — SIGNIFICANT CHANGE UP (ref 3.8–5.2)
RBC # FLD: 13.8 % — SIGNIFICANT CHANGE UP (ref 10.3–14.5)
SODIUM SERPL-SCNC: 131 MMOL/L — LOW (ref 135–145)
SP GR SPEC: 1.02 — SIGNIFICANT CHANGE UP (ref 1–1.03)
TROPONIN I, HIGH SENSITIVITY RESULT: 5.8 NG/L — SIGNIFICANT CHANGE UP
UROBILINOGEN FLD QL: 1 MG/DL — SIGNIFICANT CHANGE UP (ref 0.2–1)
WBC # BLD: 6.43 K/UL — SIGNIFICANT CHANGE UP (ref 3.8–10.5)
WBC # FLD AUTO: 6.43 K/UL — SIGNIFICANT CHANGE UP (ref 3.8–10.5)

## 2024-07-12 PROCEDURE — 83605 ASSAY OF LACTIC ACID: CPT

## 2024-07-12 PROCEDURE — 99285 EMERGENCY DEPT VISIT HI MDM: CPT | Mod: FS

## 2024-07-12 PROCEDURE — 71045 X-RAY EXAM CHEST 1 VIEW: CPT

## 2024-07-12 PROCEDURE — 96375 TX/PRO/DX INJ NEW DRUG ADDON: CPT

## 2024-07-12 PROCEDURE — 36415 COLL VENOUS BLD VENIPUNCTURE: CPT

## 2024-07-12 PROCEDURE — 74177 CT ABD & PELVIS W/CONTRAST: CPT | Mod: MC

## 2024-07-12 PROCEDURE — 99285 EMERGENCY DEPT VISIT HI MDM: CPT | Mod: 25

## 2024-07-12 PROCEDURE — 93010 ELECTROCARDIOGRAM REPORT: CPT

## 2024-07-12 PROCEDURE — 96365 THER/PROPH/DIAG IV INF INIT: CPT | Mod: XU

## 2024-07-12 PROCEDURE — 83690 ASSAY OF LIPASE: CPT

## 2024-07-12 PROCEDURE — 71045 X-RAY EXAM CHEST 1 VIEW: CPT | Mod: 26

## 2024-07-12 PROCEDURE — 81003 URINALYSIS AUTO W/O SCOPE: CPT

## 2024-07-12 PROCEDURE — 85730 THROMBOPLASTIN TIME PARTIAL: CPT

## 2024-07-12 PROCEDURE — 93005 ELECTROCARDIOGRAM TRACING: CPT

## 2024-07-12 PROCEDURE — 85025 COMPLETE CBC W/AUTO DIFF WBC: CPT

## 2024-07-12 PROCEDURE — 85610 PROTHROMBIN TIME: CPT

## 2024-07-12 PROCEDURE — 80053 COMPREHEN METABOLIC PANEL: CPT

## 2024-07-12 PROCEDURE — 84484 ASSAY OF TROPONIN QUANT: CPT

## 2024-07-12 PROCEDURE — 83735 ASSAY OF MAGNESIUM: CPT

## 2024-07-12 PROCEDURE — 74177 CT ABD & PELVIS W/CONTRAST: CPT | Mod: 26,MC

## 2024-07-12 RX ORDER — ACETAMINOPHEN 325 MG
1000 TABLET ORAL ONCE
Refills: 0 | Status: COMPLETED | OUTPATIENT
Start: 2024-07-12 | End: 2024-07-12

## 2024-07-12 RX ORDER — BENZOCAINE/MENTHOL 6 MG-10 MG
1 LOZENGE MUCOUS MEMBRANE ONCE
Refills: 0 | Status: COMPLETED | OUTPATIENT
Start: 2024-07-12 | End: 2024-07-12

## 2024-07-12 RX ORDER — LOSARTAN POTASSIUM 100 MG/1
100 TABLET, FILM COATED ORAL ONCE
Refills: 0 | Status: COMPLETED | OUTPATIENT
Start: 2024-07-12 | End: 2024-07-12

## 2024-07-12 RX ORDER — LABETALOL HYDROCHLORIDE 300 MG/1
5 TABLET ORAL ONCE
Refills: 0 | Status: COMPLETED | OUTPATIENT
Start: 2024-07-12 | End: 2024-07-12

## 2024-07-12 RX ORDER — LACTULOSE 10 G/15ML
15 SOLUTION ORAL
Qty: 150 | Refills: 0
Start: 2024-07-12 | End: 2024-07-16

## 2024-07-12 RX ORDER — SODIUM CHLORIDE 0.9 % (FLUSH) 0.9 %
500 SYRINGE (ML) INJECTION ONCE
Refills: 0 | Status: COMPLETED | OUTPATIENT
Start: 2024-07-12 | End: 2024-07-12

## 2024-07-12 RX ORDER — LACTULOSE 10 G/15ML
10 SOLUTION ORAL ONCE
Refills: 0 | Status: COMPLETED | OUTPATIENT
Start: 2024-07-12 | End: 2024-07-12

## 2024-07-12 RX ADMIN — LOSARTAN POTASSIUM 100 MILLIGRAM(S): 100 TABLET, FILM COATED ORAL at 14:09

## 2024-07-12 RX ADMIN — Medication 500 MILLILITER(S): at 13:34

## 2024-07-12 RX ADMIN — Medication 1000 MILLIGRAM(S): at 14:27

## 2024-07-12 RX ADMIN — LABETALOL HYDROCHLORIDE 5 MILLIGRAM(S): 300 TABLET ORAL at 13:35

## 2024-07-12 RX ADMIN — LACTULOSE 10 GRAM(S): 10 SOLUTION ORAL at 15:24

## 2024-07-12 RX ADMIN — Medication 1 ENEMA: at 16:47

## 2024-07-12 RX ADMIN — Medication 400 MILLIGRAM(S): at 13:34

## 2024-07-12 RX ADMIN — Medication 500 MILLILITER(S): at 14:27

## 2024-11-08 NOTE — ED ADULT NURSE NOTE - TEMPLATE LIST FOR HEAD TO TOE ASSESSMENT
Called pt back from message in the portal. She wanted an appointment to speak with Luz Andersen PA-C regarding sx options. Pt understood date / time.    General

## 2025-01-17 ENCOUNTER — NON-APPOINTMENT (OUTPATIENT)
Age: 85
End: 2025-01-17

## 2025-06-10 RX ORDER — LINACLOTIDE 145 UG/1
145 CAPSULE, GELATIN COATED ORAL
Qty: 30 | Refills: 3 | Status: ACTIVE | COMMUNITY
Start: 2025-06-10 | End: 1900-01-01

## 2025-07-01 ENCOUNTER — APPOINTMENT (OUTPATIENT)
Facility: CLINIC | Age: 85
End: 2025-07-01